# Patient Record
Sex: FEMALE | Race: WHITE | Employment: UNEMPLOYED | URBAN - METROPOLITAN AREA
[De-identification: names, ages, dates, MRNs, and addresses within clinical notes are randomized per-mention and may not be internally consistent; named-entity substitution may affect disease eponyms.]

---

## 2021-06-09 ENCOUNTER — APPOINTMENT (OUTPATIENT)
Dept: PHYSICAL THERAPY | Age: 31
End: 2021-06-09

## 2021-06-11 ENCOUNTER — HOSPITAL ENCOUNTER (OUTPATIENT)
Dept: PHYSICAL THERAPY | Age: 31
Discharge: HOME | End: 2021-06-11
Payer: MEDICAID

## 2021-06-11 PROCEDURE — 97161 PT EVAL LOW COMPLEX 20 MIN: CPT

## 2021-06-11 NOTE — PROGRESS NOTES
[facility] [facility] PHYSICAL THERAPY  [address] [facility] [address], [address] - Phone: [phone]  Fax: [phone] / [address] [facility]  Patient Name: Kristian Jaime : 1990   Medical   Diagnosis: Right knee pain [M25.561] Treatment Diagnosis: Right knee pain    Onset Date: 2021     Referral Source: [facility]): 2021   Prior Hospitalization: See medical history Provider #: [id number]   Prior Level of Function: Active lifestyle: Rock climbing, jogging, frisbee   Comorbidities: Arthritis   Medications: Verified on Patient Summary List   The Plan of Care and following information is based on the information from the initial evaluation.   ===========================================================================================  Assessment / key information: Patient is a 28 y.o. female presenting to the clinic with CC: right knee pain. HPI:.Fell while rock climbing  felt knee buckle to the medial side. She reports hearing a popping sound and her knee swelling immediately during her trip to the ER. She was given directions to RICE and bilateral crutches. She presents with deficits in AROM ext/flex and increased pain at her right knee and intermittent pain when walking, using stairs and driving long distance (using pedals) affecting ADL, work and leisure activities. Pt  will benefit from physical therapist management to address her impairments (listed below),  educate her, and improve her level of function.  Thanks for your referral.                                                       AROM                    PROM                      Strength (1-5)      Left Right Left Right Left Right   Knee Flexion 140 135 P! 142 138 P! 5 5     Extension 0 -3 0 0 P! 5 5      Palpation:    Neg/Pos   Neg/Pos   Neg/Pos   Joint Line Pos Quad tendon   Patellar ligament     Patella   Fibular head   Pes Anserinus     Tibial tubercle   Hamstring tendons   Infrapatellar fat pad      Lachmans                   []? Neg    [x]? Pos      Anterior Drawer           []? Neg    [x]? Pos        Thessaly:                    []? Neg    [x]? Pos                   Pivot Shift                    []? Neg    [x]? Pos      The patients method of injury and s/s are indicative of potential right ACL and medial meniscal involvement. She is scheduled to have and MRI on 6/15/21.     ===========================================================================================  Eval Complexity: History: LOW Complexity : Zero comorbidities / personal factors that will impact the outcome / POCExam:LOW Complexity : 1-2 Standardized tests and measures addressing body structure, function, activity limitation and / or participation in recreation  Presentation: LOW Complexity : Stable, uncomplicated  Clinical Decision Making:MEDIUM Complexity : FOTO score of 26-74Overall Complexity:LOW   Problem List: pain affecting function, decrease ROM, impaired gait/ balance, decrease ADL/ functional abilitiies, decrease activity tolerance, decrease flexibility/ joint mobility and decrease transfer abilities  FOTO score: 49 indicating 51% functional disability  Treatment Plan may include any combination of the following: Therapeutic exercise, Therapeutic activities, Neuromuscular re-education, Physical agent/modality, Gait/balance training, Manual therapy, Aquatic therapy, Patient education, Self Care training, Functional mobility training and Home safety training  Patient / Family readiness to learn indicated by: asking questions, trying to perform skills, interest, prior success with physical therapy treatment, eagerness to return to rock climbing and running.   Persons(s) to be included in education: patient (P)  Barriers to Learning/Limitations: None  Measures taken:    Patient Goal (s): \"Help my knee get better, return to rock climbing and jogging\"   Patient self reported health status: good  Rehabilitation Potential: excellent   Short Term Goals: To be accomplished in  3-4  weeks:  1. Patient to be adherent to HEP to facilitate pain control with ADL's.  2. Patient to improve right knee AROM to 0 deg. To facilitated equalization of gait mechanics. 3. Patient to improve driving tolerance to > 30 min to facilitate work related duties   Long Term Goals: To be accomplished in  6-8  weeks:  1. Patient to be Safe and Independent with HEP to self-manage/prevent symptoms after DC. 2. Patient to increase FS FOTO score to > 70 to indicate increased functional independence. 3. Patient to demonstrate stair negotiate stairs without UE assist  4x3 to facilitate home ambulation. Frequency / Duration:   Patient to be seen  2  times per week for 6-8  weeks:  Patient / Caregiver education and instruction: self care, activity modification and exercises. We reviewed our facility's Patient Personal Responsibilities (PPR) form, particularly in regards to compliance towards her appointment time, our attendance policy, and her home exercise program. Patient was informed of possible discharge for non-compliance to our attendance policy per PPR form. We also discussed her POC as deemed appropriate by the treating therapist and physician. Patient verbalized understanding that she must show objective and functional improvement in an appropriate time frame. Patient verbalized understanding that should progress or compliance be lacking, we will contact the referring physician for further consultation to address and attempt to establish alternate treatment strategies as necessary and/or possibly discharge. Therapist Signature: Elvira Montelongo, DESTIN Garner \"BJ\" Curtis Harper, MARJORIET, Cert. MDT, Cert. DN, Cert. SMT, Dip.  Osteopractic Date: 3/62/8110   Certification Period:  Time: 10:42 AM ===========================================================================================  I certify that the above Physical Therapy Services are being furnished while the patient is under my care. I agree with the treatment plan and certify that this therapy is necessary. Physician Signature:        Date:       Time:                                         Kaykay Esteves,*   Please sign and return to [employer] or you may fax the signed copy to [phone]. Thank you.

## 2021-06-11 NOTE — PROGRESS NOTES
PHYSICAL THERAPY - DAILY TREATMENT NOTE  Patient Name: Davee Buerger        Date: 2021  : 1990   [x]  Patient  Verified  Visit #:   1     Insurance: Payor: BLUE CROSS MEDICAID / Plan: [facility] [place] / Product Type: Managed Care Medicaid /      In time:   9:30          Out time:   10:10 Total Treatment Time (min):   40   Medicare Time Tracking (below)   Total Timed Codes (min):   1:1 Treatment Time:       TREATMENT AREA = Right knee pain [M25.561]    SUBJECTIVE    Pain Level (on 0 to 10 scale):  1  / 10   Medication Changes/New allergies or changes in medical history, any new surgeries or procedures? [x]  No    []  Yes   If yes, update Summary List:    Subjective Functional Status/Changes:  []  No changes reported     HISTORY    HPI: [address] while rock climbing  felt knee buckle to the medial side \"felt like my knee blew out again. \" Previous ACL reconstruction to right knee when she was 19. Taken to ER given crutches and told to RICE. Noticed swelling immediately following trauma. \"Probably going to have surgery\"    Present Symptoms: Pain when walking, pain at medial knee \"feels deeper in the joint\"    Present since:   [] Improving [x]  Unchanging []  Worsening          Commenced as a result of:  Trauma during fall rock climbing. or []  No apparent reason   or Surgery    Symptoms at onset: Immediate swelling and global pain at right knee following trauma. Reports hearing a snap. Constant symptoms: N/A    Intermittent symptoms: When walking pain on medial surface, initial at posterior surface.  Throbbing giving out feeling when using pedals, carrying groceries     What produces or worsens:  Walking, driving long distances, negotiating steps, jogging    What stops or reduces: Doing nothing (rest     Continued use makes the pain:  [] Better [x]  Worse []  No effect     Disturbed night: [] No    [x] Yes  Sometimes  Pain at rest: [x] No    [] Yes       Treatments this episode: N/A    Previous episodes: N/a    Previous treatment: Previous physical therapy for right knee following an ACL repair. Other:       Spinal history:    Paraesthesia: [x] No    [] Yes     Effect of medications (if appropriate)-N/A  General Health:  [x] Good []  Fair []  Poor     Imaging:  X rays show no fracture, MRI scheduled. 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 [address] was working at the [employer] but currently unable to perform duties (wants to return)     Leisure: Mechanical Stresses: Running, rock climbing, frisbee    Functional disability from present episode: Difficulty negotiating four steps to enter and one flight to get to room upstairs decreased, carrying groceries,     Summary:    [] Acute [x]  Sub-acute []  Chronic     [x] Trauma/Surgery []  Insidious onset        OBJECTIVE    Gait:  [] Normal    [] Abnormal    [x] Antalgic    [] NWB    Device: None    Describe: Decreased extension and time in SLS on RLE.     Myotome Level Muscles Nerve Reflex Sensation Action   L1-L3 Iliopsoas T12/L1-3  Quadriceps L2-4  Adductors L2-L4 (Iliacus)- Femoral  Femoral  Obturator/Sciatic N/A  L3-4 = Patella L1- Inguinal Crease  L2- Anterior Thigh  L3- Anterior Thigh above knee Hip Flexion  Knee Extension   L4 Tibialis anterior L4&5   Deep Peroneal Patella Anterior Knee Suprapatellar Ankle Dorsiflexion   L5 Extensor Hallucis Longus  Extensor Digitorum Lungus  Gluteus Medius Deep Peroneal         Superior Gluteal None Reliable Dorsum of Foot/Great Toe  Anterior Shank Extensor  to Great Toe        Hip Internal Rotation   S1 Peroneus Longus  Gastrocnemius & Soleus  Gluteus Luiz Superficial Peroneal  Tibial    Inferior Gluteal Achilles Tendon Lateral Shank around Lateral Malleolus  Lateral Aspect/Dorsum of GT   Plantar Flexion      Hip Extension   Notable findings from above: unremarkable    ROM / Strength  [] Unable to assess                  AROM                      PROM                   Strength (1-5)    Left Right Left Right Left Right   Hip Flexion WNL WNL WNL WNL 5 5    Extension WNL WNL WNL WNL 5 5    Abduction WNL WNL WNL WNL 4 5    Adduction WNL WNL WNL WNL 5 5         AROM          PROM                      Strength (1-5)    Left Right Left Right Left Right   Knee Flexion 140 135 P! 142 138 P! 5 5    Extension 0 -3 0 0 P! 5 5     Ankle Plantarflexion          Dorsiflexion           Flexibility: [] Unable to assess at this time  Hamstrings:    (L) Tightness= [x] WNL   [] Min   [] Mod   [] Severe    (R) Tightness= [x] WNL   [] Min   [] Mod   [] Severe  Quadriceps:    (L) Tightness= [x] WNL   [] Min   [] Mod   [] Severe    (R) Tightness= [x] WNL   [] Min   [] Mod   [] Severe  Gastroc:      (L) Tightness= [x] WNL   [] Min   [] Mod   [] Severe    (R) Tightness= [x] WNL   [] Min   [] Mod   [] Severe  Other:    Palpation:   Neg/Pos  Neg/Pos  Neg/Pos   Joint Line Pos Quad tendon  Patellar ligament    Patella  Fibular head  Pes Anserinus    Tibial tubercle  Hamstring tendons  Infrapatellar fat pad      Optional Tests:  Patellar Positioning (Static)   []L []R Normal []L []R Lateral   []L []R Monson Estrellita      []L []R Medial   []L []R [place]    Patellar Tracking   []L []R Glide (Lat)   []L []R Tilt (Lat)     []L []R Glide (Med)  []L []R Tilt (Med)      []L []R Tile (Inf)     Patellar Mobility   []L []R Hypermobile []L []R Hypomobile         Girth Measurements: unremarkable    Cm at  Cm 4\" above joint line   Cm 4\" below joint line   Left      Right         Lachmans  [] Neg    [x] Pos  Posterior Drawer [] Neg    [] Pos  Pivot Shift  [] Neg    [x] Pos Posterior Sag  [] Neg    [] Pos  CHACE   [] Neg    [] Pos Elizabeth's Test [] Neg    [] Pos  ALRI   [] Neg    [] Pos Squat   [] Neg    [] Pos  Valgus@ 0 Degrees [] Neg    [] Pos Izzy [] Neg    [] Pos  Valgus@ 30 Degrees [] Neg    [] Pos Patellar Apprehension[] Neg    [] Pos  Varus@ 0 Degrees [] Neg    [] Pos Zelaya's Compression [] Neg    [] Pos  Varus@ 30 Degrees [] Neg    [] Pos Ely's Test  [] Neg    [] Pos  Apley's Compression [] Neg    [] Pos Clifton's Test  [] Neg    [] Pos  Apley's Distraction [] Neg    [] Pos Stroke Test  [] Neg    [] Pos   Anterior Drawer [] Neg    [x] Pos Fluctuation Test [] Neg    [] Pos  Thessaly:                  [] Neg    [x] Pos                 Spine:      Movement loss:     Effect of repeated movements:     Effect of static positioning:     Spine testing:  [] Not relevant []  Relevant []  Secondary problem     Active movement, passive movement, resisted test During movement:  Produce, abolish, increase, decrease, NE After movement:  Better, worse, NB, NW, NE Inc. ROM Dec. ROM No Effect      []   []   []        []   []   []        []   []   []        []      []      []        Effect of static positioning   []   []   []        []   []   []        []   []   []        []      []      []           []      []      []        Other tests: e.g. loaded, compression, unloaded, etc.   []      []      []           []      []      []           []      []      []           []   []   []         Therapeutic Procedures:  Min Procedure Specifics + Rationale   n/a [x]  Patient Education (performed throughout session) [x] Review HEP    [] Progressed/Changed HEP based on:   [] proper performance and advancement of Therex/TA   [] reduction in pain level    [] increased functional capacity       [] change in directional preference     Modality rationale: decrease inflammation, decrease pain, increase tissue extensibility and increase muscle contraction/control to improve the patients ability to perform ADL's with greater ease     [x] Skin assessment post-treatment:  [x]intact []redness- no adverse reaction       []redness  adverse reaction:         Post Treatment Pain Level (on 0 to 10) scale:   1 / 10     ASSESSMENT    Assessment/Changes in Function: Justification for Eval Code Complexity:  Patient History : Low  Examination Low  Clinical Presentation: Low  Clinical Decision Making : FOTO Low   []  See Progress Note/Recertification   Patient will continue to benefit from skilled PT services to  modify and progress therapeutic interventions, address functional mobility deficits, address ROM deficits, address strength deficits, analyze and address soft tissue restrictions, analyze and cue movement patterns, analyze and modify body mechanics/ergonomics, assess and modify postural abnormalities and instruct in home and community integration to attain remaining goals       [x]  Upgrade activities as tolerated []  Continue plan of care   []  Discharge due to :    [x]  Other: Initiate POC     Therapist: Tatyana Schilling \"BJ\" Sia Rain, DPT, Cert. MDT, Cert. DN, Cert. SMT, Dip.  Osteopractic Date: 6/11/2021     Time: 9:21 AM

## 2021-06-21 ENCOUNTER — HOSPITAL ENCOUNTER (OUTPATIENT)
Dept: PHYSICAL THERAPY | Age: 31
Discharge: HOME | End: 2021-06-21
Payer: MEDICAID

## 2021-06-21 PROCEDURE — 97530 THERAPEUTIC ACTIVITIES: CPT

## 2021-06-21 PROCEDURE — 97535 SELF CARE MNGMENT TRAINING: CPT

## 2021-06-21 PROCEDURE — 97110 THERAPEUTIC EXERCISES: CPT

## 2021-06-21 NOTE — PROGRESS NOTES
PHYSICAL THERAPY - DAILY TREATMENT NOTE    Patient Name: Jarret Olivares        Date: 2021  : 1990   YES Patient  Verified  Visit #:   2   of   12  Insurance: Payor: BLUE CROSS MEDICAID / Plan: [facility] HE[employer]EEPERS PLUS / Product Type: Managed Care Medicaid /      In time: 4:15 Out time: 4:53   Total Treatment Time: 38     Medicare/BCBS [place] Time Tracking (below)   Total Timed Codes (min):  38 1:1 Treatment Time:  38     TREATMENT AREA =  Right knee    SUBJECTIVE    Pain Level (on 0 to 10 scale):  1  / 10   Medication Changes/New allergies or changes in medical history, any new surgeries or procedures? NO    If yes, update Summary List   Subjective Functional Status/Changes:  []  No changes reported     \"Its the same as it always is. I had the MRI. I have a meniscus tear\"          OBJECTIVE    18 min Therapeutic Exercise:  [x]  See flow sheet   Rationale:      increase ROM and increase strength to improve the patients ability to amb with less pain     10 min Therapeutic Activity: QS and education on alignment for Amb and ADLs   Rationale:   Decrease stress on knee    10 mins Self Care: Education on injury and medical options likely moving forward     min Patient Education:  YES  Reviewed HEP   []  Progressed/Changed HEP based on:   Updated HEP     Other Objective/Functional Measures:    Interm c/o pain through out, most noticeable with quad contraction at end range extension. Pain localized to medial patella.  No pain with posterior chain activities such as hip ext SLR, prone HS curl and bridges  Educated pt on anatomy of injury and mst likely medical options moving forward and benefits of PT   Post Treatment Pain Level (on 0 to 10) scale:   1  / 10     ASSESSMENT    Assessment/Changes in Function:     Reports of pain with SLR flex, less with QS      []  See Progress Note/Recertification   Patient will continue to benefit from skilled PT services to analyze, cue, progress, modify,, demonstrate, instruct, and address, movement patterns, therapeutic interventions, postural abnormalities, soft tissue restrictions, ROM, strength, functional mobility, body mechanics/ergonomics, and home and community integration, to attain remaining goals.    Progress toward goals / Updated goals:    Plan to add step ups NV     PLAN    []  Upgrade activities as tolerated YES Continue plan of care   []  Discharge due to :    []  Other:      Therapist: Willow Reynoso DPT    Date: 6/21/2021 Time: 5:15 PM     Future Appointments   Date Time Provider Elizabeth Gage   6/23/2021  9:30 AM Cathleen Vela, PT [facility]   6/28/2021  5:00 PM Arnold Reynoso PT [facility]   6/30/2021  8:00 AM Cathleen Vela, PT [facility]

## 2021-06-23 ENCOUNTER — HOSPITAL ENCOUNTER (OUTPATIENT)
Dept: PHYSICAL THERAPY | Age: 31
Discharge: HOME | End: 2021-06-23
Payer: MEDICAID

## 2021-06-23 PROCEDURE — 97112 NEUROMUSCULAR REEDUCATION: CPT

## 2021-06-23 PROCEDURE — 97110 THERAPEUTIC EXERCISES: CPT

## 2021-06-23 PROCEDURE — 97530 THERAPEUTIC ACTIVITIES: CPT

## 2021-06-23 NOTE — PROGRESS NOTES
PHYSICAL THERAPY - DAILY TREATMENT NOTE    Patient Name: Maricel Russell        Date: 2021  : 1990   YES Patient  Verified  Visit #:   3   of     Insurance: Payor: BLUE CROSS MEDICAID / Plan: [address] [place] [address] / Product Type: Managed Care Medicaid /      In time: 8:30 am Out time: 10:25   Total Treatment Time: 55     Medicare/BCBS Time Tracking (below)   Total Timed Codes (min):  55 1:1 Treatment Time:  40     TREATMENT AREA = Right knee pain [M25.561]    SUBJECTIVE    Pain Level (on 0 to 10 scale):  1  / 10   Medication Changes/New allergies or changes in medical history, any new surgeries or procedures? NO    If yes, update Summary List   Subjective Functional Status/Changes:  []  No changes reported     \"No increased pain following previous session just a little sore\"   I walked a lot more Monday so maybe that's why I was having a hard time. 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)     OBJECTIVE     Therapeutic Procedures:  Min Procedure Specifics + Rationale   n/a [x]  Patient Education (performed throughout session) [x] Review HEP    [] Progressed/Changed HEP based on:   [] proper performance and advancement of Therex/TA   [] reduction in pain level    [] increased functional capacity       [] change in directional preference   15(10) [x] Therapeutic Exercise   [x]  See Flowsheet   Rationale: increase ROM and increase strength to improve the patients ability to participate in ADL's    8 [x]  Gait Training       SS,HK  Rationale: Normalize gait, increase proprioceptive and kinesthetic awareness, coordination, balance   10 [x] Therapeutic Activity   [x]  See Flowsheet    Rationale:  To improve safety, proprioception, coordination, and efficiency with tasks   12 [x] Neuromuscular Re-ed   [x]  See Flowsheet    Rationale: increase ROM, increase strength, improve coordination, improve balance and increase proprioception  to improve the patients ability to safely participate in ADL's     Modality rationale: decrease inflammation, decrease pain, increase tissue extensibility and increase muscle contraction/control to improve the patients ability to perform ADL's with greater ease     Min Type Additional Details   10 [x]  Cold Pack   [] pre-ROMÁN      [x] post-ROMÁN  []  Ice massage Location:Right knee    [x] supine              [] prone  [x] legs elevated    [] legs flat   [] sitting   [x] Skin assessment post-treatment:  [x]intact [x]redness- no adverse reaction       []redness  adverse reaction:     Other Objective/Functional Measures:    Pt educated on her MRI results. She required VC/Demo for safety/form during tap-down  Added: Tap down using FWD/LAT 3 inch step, mini squats, Hip 3 ways in standing  She required VC/Demo for safety/form during tap-down  HEP: OTB TKE added. Post Treatment Pain Level (on 0 to 10) scale:   0  / 10     ASSESSMENT    Assessment/Changes in Function:       Pt displays Quad lag during LAQ and fatigue during tap downs. Continue to progress Quad strengthening exercises to promote control and proprioception during ADL/ambulation. She c/o medial right knee tenderness throughout today's session especially during S/L ABD. Patient will continue to benefit from skilled PT services to modify and progress therapeutic interventions, address functional mobility deficits, address ROM deficits, address strength deficits, analyze and address soft tissue restrictions, analyze and cue movement patterns, analyze and modify body mechanics/ergonomics, assess and modify postural abnormalities and instruct in home and community integration  to attain remaining goals   Progress toward goals / Updated goals: · Short Term Goals: To be accomplished in  3-4  weeks:  1.  Patient to be adherent to HEP to facilitate pain control with ADL's.  2. Patient to improve right knee AROM to 0 deg. To facilitated equalization of gait mechanics. 3. Patient to improve driving tolerance to > 30 min to facilitate work related duties     PLAN    [x]  Upgrade activities as tolerated  [x]  Update interventions per flow sheet YES Continue plan of care   []  Discharge due to :    []  Other:      Therapist: Claus Pulido, SPT  Pascual \"BJ\" Sharon, DPT, Cert. MDT, Cert. DN, Cert. SMT, Dip.  Osteopractic    Date: 6/23/2021 Time: 9:39 AM     Future Appointments   Date Time Provider Elizabeth Gage   6/29/2021  9:30 AM Maylin Genao, PT [facility]   6/30/2021  8:00 AM Sarah Miramontes, PT [facility]

## 2021-06-28 ENCOUNTER — APPOINTMENT (OUTPATIENT)
Dept: PHYSICAL THERAPY | Age: 31
End: 2021-06-28
Payer: MEDICAID

## 2021-06-29 ENCOUNTER — HOSPITAL ENCOUNTER (OUTPATIENT)
Dept: PHYSICAL THERAPY | Age: 31
Discharge: HOME | End: 2021-06-29
Payer: MEDICAID

## 2021-06-29 PROCEDURE — 97112 NEUROMUSCULAR REEDUCATION: CPT

## 2021-06-29 PROCEDURE — 97110 THERAPEUTIC EXERCISES: CPT

## 2021-06-29 PROCEDURE — 97530 THERAPEUTIC ACTIVITIES: CPT

## 2021-06-29 NOTE — PROGRESS NOTES
PHYSICAL THERAPY - DAILY TREATMENT NOTE    Patient Name: Zach Confer        Date: 2021  : 1990   YES Patient  Verified  Visit #:   4   of   12  Insurance: Payor: BLUE CROSS MEDICAID / Plan: [facility] HEALTHKEEPERS PLUS / Product Type: Managed Care Medicaid /      In time: 9:25 am Out time: 10:25 am   Total Treatment Time: 60     Medicare/BCBS Time Tracking (below)   Total Timed Codes (min):  60 1:1 Treatment Time:  50     TREATMENT AREA = Right knee pain [M25.561]    SUBJECTIVE    Pain Level (on 0 to 10 scale):  0  / 10   Medication Changes/New allergies or changes in medical history, any new surgeries or procedures? NO    If yes, update Summary List   Subjective Functional Status/Changes:  []  No changes reported     \"I had to run to my car away from yellow flies (20 ft) it felt ok. Walked on a trail (about a mile) I felt ok during but sore after. My new job starts today with [employer] not sure of all the job duties maybe some light lifting according to my supervisor. \"     \"I may need to reschedule tomorrows appointment. \"       OBJECTIVE     Therapeutic Procedures:  Min Procedure Specifics + Rationale   n/a [x]  Patient Education (performed throughout session) [x] Review HEP    [] Progressed/Changed HEP based on:   [] proper performance and advancement of Therex/TA   [] reduction in pain level    [] increased functional capacity       [] change in directional preference   20 [x] Therapeutic Exercise   [x]  See Flowsheet   Rationale: increase ROM and increase strength to improve the patients ability to participate in ADL's    9 [x]  Gait Training         Rationale: Normalize gait, increase proprioceptive and kinesthetic awareness, coordination, balance   13 [x] Therapeutic Activity   [x]  See Flowsheet  Mini squat, step up/down, tap downs  Rationale:  To improve safety, proprioception, coordination, and efficiency with tasks   8 [x] Neuromuscular Re-ed   [x]  See [employer], LAQ, Standing knee flex  Rationale: increase ROM, increase strength, improve coordination, improve balance and increase proprioception  to improve the patients ability to safely participate in ADL's     Modality rationale: decrease inflammation, decrease pain, increase tissue extensibility and increase muscle contraction/control to improve the patients ability to perform ADL's with greater ease     Min Type Additional Details   10 [x]  Cold Pack   [] pre-ROMÁN      [x] post-ROMÁN  []  Ice massage Location:Right knee    [x] supine              [] prone  [x] legs elevated    [] legs flat   [] sitting   [x] Skin assessment post-treatment:  [x]intact [x]redness- no adverse reaction       []redness  adverse reaction:     Other Objective/Functional Measures:    Pt required VC/Demo for correct form/safety during exercises. Initiated: Hamstring stretch at step, SL bridges, standing knee flexion  Increased reps/sets represented by FS. Post Treatment Pain Level (on 0 to 10) scale:   0  / 10     ASSESSMENT    Assessment/Changes in Function:       Pt continues to display quad lag bilaterally during LAQ. Continue to progress SLS and dynamic activities to facilitate her stability and balance. Patient will continue to benefit from skilled PT services to modify and progress therapeutic interventions, address functional mobility deficits, address ROM deficits, address strength deficits, analyze and address soft tissue restrictions, analyze and cue movement patterns, analyze and modify body mechanics/ergonomics, assess and modify postural abnormalities and instruct in home and community integration  to attain remaining goals   Progress toward goals / Updated goals: · Short Term Goals: To be accomplished in  3-4  weeks:  1. Patient to be adherent to HEP to facilitate pain control with ADL's.  2. Patient to improve right knee extension AROM to 0 deg. To facilitated equalization of gait mechanics.   3. Patient to improve driving tolerance TZ > 27 min to facilitate work related duties     PLAN    [x]  Upgrade activities as tolerated  [x]  Update interventions per flow sheet YES Continue plan of care   []  Discharge due to :    []  Other:      Therapist: Drake Schaumann, DESTIN Flynne \"BJ\" MARJORIE HerreraT, Cert. MDT, Cert. DN, Cert. SMT, Dip.  Osteopractic    Date: 6/29/2021 Time: 8:19 AM     Future Appointments   Date Time Provider Elizabeth Gage   6/29/2021  9:30 AM Ampuneet Rojas, PT [facility]   6/30/2021  8:00 AM Ashly Rojas PT [facility]

## 2021-06-30 ENCOUNTER — APPOINTMENT (OUTPATIENT)
Dept: PHYSICAL THERAPY | Age: 31
End: 2021-06-30
Payer: MEDICAID

## 2021-07-16 ENCOUNTER — HOSPITAL ENCOUNTER (OUTPATIENT)
Dept: PHYSICAL THERAPY | Age: 31
Discharge: HOME | End: 2021-07-16
Payer: MEDICAID

## 2021-07-16 PROCEDURE — 97116 GAIT TRAINING THERAPY: CPT

## 2021-07-16 PROCEDURE — 97112 NEUROMUSCULAR REEDUCATION: CPT

## 2021-07-16 PROCEDURE — 97110 THERAPEUTIC EXERCISES: CPT

## 2021-07-16 PROCEDURE — 97530 THERAPEUTIC ACTIVITIES: CPT

## 2021-07-16 NOTE — PROGRESS NOTES
[address] [facility] PHYSICAL THERAPY  [address] [facility] [address], [address] - Phone: [phone]  Fax: [phone]  PROGRESS NOTE  Patient Name: Dejuan Dan : 1990   Treatment/Medical Diagnosis: Right knee pain [M25.561]   Referral Source: Carlos Castellanos,*     Date of Initial Visit: 21 Attended Visits: 5 Missed Visits: 2 weeks     SUMMARY OF TREATMENT  Patient's POC has consisted of therex, therapeutic activities, manual therapy prn, modalities prn, pt. education, and a comprehensive HEP. Treatment strategies used to address functional mobility deficits, ROM deficits, strength deficits, analyze and address soft tissue restrictions, analyze and cue movement patterns, analyze and modify body mechanics/ergonomics, assess and modify postural abnormalities and instruct in home and community integration. CURRENT STATUS  Patient's pain has progressively improved, with report of being able to go camping x2weeks, during which she hiked several trails. She reports the pain \"not being as bad as it had been or expected it to be\". She reports since her last hike, she feels a sense of intermittent  \"shifting\" in the knee with walking. Goal/Measure of Progress Goal Met? · Short Term Goals:   1. Patient to be adherent to HEP to facilitate pain control with ADL's.  2. Patient to improve right knee AROM to 0 deg. To facilitated equalization of gait mechanics. 3. Patient to improve driving tolerance to > 30 min to facilitate work related duties   MET    MET      MET     New Goals to be achieved in __6__  weeks:  1. Patient to be Safe and Independent with HEP to self-manage/prevent symptoms after DC. 2. Patient to increase FS FOTO score to > 70 to indicate increased functional independence. 3. Patient to demonstrate stair negotiate stairs without UE assist  4x3 to facilitate home ambulation.     Frequency / Duration:   Patient to be seen  2-3  times per week for 6  weeks:    RECOMMENDATIONS  Continue and progress functional therex/therapeutic activity as able, utilizing manual therapy and modalities prn. Progress patient towards independent HEP to facilitate self-management of symptoms and progress gains after PT. If you have any questions/comments please contact us directly at [phone]. Thank you for allowing us to assist in the care of your patient. Therapist Signature: Ruddy Vo \"BJ\" Tung Barney, MARJORIET, Cert. MDT, Cert. DN, Cert. SMT, Dip. Osteopractic Date: 8/94/5047   Certification Period: n/a     Reporting Period n/a   Time: 9:39 AM   NOTE TO PHYSICIAN:  PLEASE COMPLETE THE ORDERS BELOW AND FAX TO   [facility]: [phone]. If you are unable to process this request in 24 hours please contact our office: [phone].    ___ I have read the above report and request that my patient continue as recommended.   ___ I have read the above report and request that my patient continue therapy with the following changes/special instructions:_________________________________________________________   ___ I have read the above report and request that my patient be discharged from therapy.      Physician Signature:        Date:       Time:                                        Hue Herrera,*

## 2021-07-16 NOTE — PROGRESS NOTES
PHYSICAL THERAPY - DAILY TREATMENT NOTE    Patient Name: Ag Chung        Date: 2021  : 1990   YES Patient  Verified  Visit #:   5     Insurance: Payor: BLUE CROSS MEDICAID / Plan: [employer] HEALTHKEEPERS PLUS / Product Type: Managed Care Medicaid /      In time: 9:17 Out time: 10:10   Total Treatment Time: 53     Medicare/BCBS Time Tracking (below)   Total Timed Codes (min):  53 1:1 Treatment Time:  53     TREATMENT AREA = Right knee pain [M25.561]    SUBJECTIVE    Pain Level (on 0 to 10 scale):  3  / 10   Medication Changes/New allergies or changes in medical history, any new surgeries or procedures? NO    If yes, update Summary List   Subjective Functional Status/Changes:  []  No changes reported     \"I just got back from a camping trip. My knee did surprisingly well. It didn't hurt as much as I expected it to. I did get a lot of soreness from the last trail we did. Now it feels like there's a \"shift\" in it when I do some things. \"          OBJECTIVE     Therapeutic Procedures:  Min Procedure Specifics + Rationale   n/a [x]  Patient Education (performed throughout session) [x] Review HEP    [] Progressed/Changed HEP based on:   [] proper performance and advancement of Therex/TA   [] reduction in pain level    [] increased functional capacity       [] change in directional preference   15 [x] Therapeutic Exercise   [x]  See Flowsheet   Rationale: increase ROM and increase strength to improve the patients ability to participate in ADL's    8 [x]  Gait Training       Agility Ladder: Zig Zag, mini-zigzag, in/out sidestep, in/out forward, sidestepping, HK, lateral cammy cammy. Rationale: Normalize gait, increase proprioceptive and kinesthetic awareness, coordination, balance   15 [x] Therapeutic Activity   [x]  See Flowsheet  Rationale:  To improve safety, proprioception, coordination, and efficiency with tasks   15 [x] Neuromuscular Re-ed   [x]  See Flowsheet  Rationale: increase ROM, increase strength, improve coordination, improve balance and increase proprioception  to improve the patients ability to safely participate in ADL's     Modality rationale: decrease inflammation, decrease pain, increase tissue extensibility and increase muscle contraction/control to improve the patients ability to perform ADL's with greater ease     Min Type Additional Details   10 [x]  Cold Pack   [] pre-ROMÁN      [x] post-ROMÁN  []  Ice massage Location:Right Knee    [x] supine              [] prone  [] legs elevated    [] legs flat   [] sitting   [x] Skin assessment post-treatment:  [x]intact [x]redness- no adverse reaction       []redness  adverse reaction:     Other Objective/Functional Measures:    See PN     Post Treatment Pain Level (on 0 to 10) scale:   0  / 10     ASSESSMENT    Assessment/Changes in Function:       See PN         Patient will continue to benefit from skilled PT services to modify and progress therapeutic interventions, address functional mobility deficits, address ROM deficits, address strength deficits, analyze and address soft tissue restrictions, analyze and cue movement patterns, analyze and modify body mechanics/ergonomics, address imbalance/dizziness and instruct in home and community integration  to attain remaining goals   Progress toward goals / Updated goals:    See PN     PLAN    [x]  Upgrade activities as tolerated  [x]  Update interventions per flow sheet YES Continue plan of care   []  Discharge due to :    []  Other:      Therapist: Klaudia Lujan \"RENATA\" Sandra Bee DPT, Cert. MDT, Cert. DN, Cert. SMT, Dip.  Osteopractic    Date: 7/16/2021 Time: 9:07 AM     Future Appointments   Date Time Provider Elizabeth Gage   7/16/2021  9:30 AM Milo Crook, PT [facility]

## 2021-07-23 ENCOUNTER — HOSPITAL ENCOUNTER (OUTPATIENT)
Dept: PHYSICAL THERAPY | Age: 31
Discharge: HOME | End: 2021-07-23
Payer: MEDICAID

## 2021-07-23 PROCEDURE — 97530 THERAPEUTIC ACTIVITIES: CPT

## 2021-07-23 PROCEDURE — 97112 NEUROMUSCULAR REEDUCATION: CPT

## 2021-07-23 PROCEDURE — 97116 GAIT TRAINING THERAPY: CPT

## 2021-07-23 PROCEDURE — 97110 THERAPEUTIC EXERCISES: CPT

## 2021-07-23 NOTE — PROGRESS NOTES
PHYSICAL THERAPY - DAILY TREATMENT NOTE    Patient Name: Ariel Bailey        Date: 2021  : 1990   YES Patient  Verified  Visit #:      12  Insurance: Payor: BLUE CROSS MEDICAID / Plan: [facility] HEALTHKEEPERS PLUS / Product Type: Managed Care Medicaid /      In time: 8:38 Out time: 10:48   Total Treatment Time: 70     Medicare/BCBS Time Tracking (below)   Total Timed Codes (min):  70 1:1 Treatment Time:  60     TREATMENT AREA = Right knee pain [M25.561]    SUBJECTIVE    Pain Level (on 0 to 10 scale):  0  / 10   Medication Changes/New allergies or changes in medical history, any new surgeries or procedures? NO    If yes, update Summary List   Subjective Functional Status/Changes:  []  No changes reported     \"It's ok today but yesterday it kept buckling on me. I haven't had my new work schedule yet so I haven't been able to schedule. \"          OBJECTIVE     Therapeutic Procedures:  Min Procedure Specifics + Rationale   n/a [x]  Patient Education (performed throughout session) [x] Review HEP    [] Progressed/Changed HEP based on:   [] proper performance and advancement of Therex/TA   [] reduction in pain level    [] increased functional capacity       [] change in directional preference   15 [x] Therapeutic Exercise   [x]  See Flowsheet   Rationale: increase ROM and increase strength to improve the patients ability to participate in ADL's    15 [x] Therapeutic Activity   [x]  See Flowsheet  Forward/Back Lunge with rotation  [place] Squats  Rationale:  To improve safety, proprioception, coordination, and efficiency with tasks   17 [x] Neuromuscular Re-ed   [x]  See Flowsheet  BOSU Squats  BOSU Static Lunge  Rationale: increase ROM, increase strength, improve coordination, improve balance and increase proprioception  to improve the patients ability to safely participate in ADL's     8 [x]  Gait Training Agility Ladder: Zig Zag, mini-zigzag, in/out sidestep, in/out forward, sidestepping, HK, lateral cammy cammy.   (performed on balls of feet)  Rationale: Normalize gait, increase proprioceptive and kinesthetic awareness, coordination, balance       Modality rationale: decrease inflammation, decrease pain, increase tissue extensibility and increase muscle contraction/control to improve the patients ability to perform ADL's with greater ease     Min Type Additional Details   10 [x]  Cold Pack   [] pre-ROMÁN      [x] post-ROMÁN  []  Ice massage Location:Right Knee    [] supine              [] prone  [] legs elevated    [] legs flat   [] sitting   [x] Skin assessment post-treatment:  [x]intact [x]redness- no adverse reaction       []redness  adverse reaction:     Other Objective/Functional Measures: Added new therex per flow sheet. Post Treatment Pain Level (on 0 to 10) scale:   0  / 10     ASSESSMENT    Assessment/Changes in Function:       Notable lack of stability when performing lateral movements in lunge         Patient will continue to benefit from skilled PT services to modify and progress therapeutic interventions, address functional mobility deficits, address ROM deficits, address strength deficits, analyze and address soft tissue restrictions, analyze and cue movement patterns, analyze and modify body mechanics/ergonomics and instruct in home and community integration  to attain remaining goals   Progress toward goals / Updated goals:    1st session since progress note. No significant progress observed       PLAN    [x]  Upgrade activities as tolerated  [x]  Update interventions per flow sheet YES Continue plan of care   []  Discharge due to :    []  Other:      Therapist: Michelle Troncoso \"RENATA\" JAVON Herrera, Cert. MDT, Cert. DN, Cert. SMT, Dip. Osteopractic    Date: 7/23/2021 Time: 8:46 AM   No future appointments.

## 2021-08-05 ENCOUNTER — HOSPITAL ENCOUNTER (OUTPATIENT)
Dept: PHYSICAL THERAPY | Age: 31
Discharge: HOME | End: 2021-08-05
Payer: MEDICAID

## 2021-08-05 PROCEDURE — 97530 THERAPEUTIC ACTIVITIES: CPT

## 2021-08-05 PROCEDURE — 97112 NEUROMUSCULAR REEDUCATION: CPT

## 2021-08-05 PROCEDURE — 97110 THERAPEUTIC EXERCISES: CPT

## 2021-08-05 NOTE — PROGRESS NOTES
PHYSICAL THERAPY - DAILY TREATMENT NOTE    Patient Name: Ramila Jasso        Date: 2021  : 1990   YES Patient  Verified  Visit #:     Insurance: Payor: BLUE CROSS MEDICAID / Plan: [facility] HEALTHKEEPERS PLUS / Product Type: Managed Care Medicaid /      In time: 8:46 Out time: 9:37   Total Treatment Time: 51     Medicare/BCBS [place] Time Tracking (below)   Total Timed Codes (min):  51 1:1 Treatment Time:  51     TREATMENT AREA =  Right knee pain [M25.561]  SUBJECTIVE    Pain Level (on 0 to 10 scale):  3  / 10   Medication Changes/New allergies or changes in medical history, any new surgeries or procedures? NO    If yes, update Summary List   Subjective Functional Status/Changes:  []  No changes reported     Pt reports increased pain due to having been working more, which requires standing/walking for prolonged periods of time. Pt reports that she is working at [employer] and [employer]. Pt reports that pain was 8/10 a couple of days ago. Pt reports that she is not able to perform HEP daily due to working 2 jobs and taking online classes.        OBJECTIVE    21 min Therapeutic Exercise:  [x]  See flow sheet   Rationale:      increase ROM, increase strength, improve coordination, improve balance and increase proprioception to improve the patients ability to perform ADLs/IADLs, functional mobility and gait safely and independently without increased pain/symptoms     15 min Therapeutic Activity: See flow sheet   Rationale: increase ROM, increase strength, improve coordination, improve balance and increase proprioception to improve the patients ability to perform ADLs/IADLs, functional mobility and gait safely and independently without increased pain/symptoms     15 min Neuromuscular Re-ed: See flow sheet   Rationale: to improve the patients ability to perform ADLs/IADLs, functional mobility and gait safely and independently without increased pain/symptomsto improve the patients ability to perform ADLs/IADLs, functional mobility and gait safely and independently without increased pain/symptoms       During TE/TA/NM min Patient Education:  YES  Reviewed HEP   []  Progressed/Changed HEP based on:   Cont HEP; discussed importance of compliance     Other Objective/Functional Measures:    Exercises per flow sheet  Initiated clam, piriformis stretch, prone quad stretch     Post Treatment Pain Level (on 0 to 10) scale:   1  / 10     ASSESSMENT    Assessment/Changes in Function:     Tolerated exercises without c/o increased pain  Demonstrates squat to ~90 degrees knee flexion with good form     []  See Progress Note/Recertification   Patient will continue to benefit from skilled PT services to modify and progress therapeutic interventions, address functional mobility deficits, address ROM deficits, address strength deficits, analyze and address soft tissue restrictions, analyze and cue movement patterns, analyze and modify body mechanics/ergonomics, assess and modify postural abnormalities, address imbalance/dizziness and instruct in home and community integration to attain remaining goals. Progress toward goals / Updated goals:    Progressing toward goals:  1. Patient to be Safe and Independent with HEP to self-manage/prevent symptoms after DC. - Reports partial compliance with HEP  2. Patient to increase FS FOTO score to > 70 to indicate increased functional independence.    3. Patient to demonstrate stair negotiate stairs without UE assist  4x3 to facilitate home ambulation. - Performed step-ups without UE assist       PLAN    [x]  Upgrade activities as tolerated YES Continue plan of care   []  Discharge due to :    []  Other:      Therapist: Azra Garibay, PT    Date: 8/5/2021 Time: 8:57 AM     Future Appointments   Date Time Provider Elizabeth Gage   8/10/2021  5:00 PM Dulce Ndiaye, JEANNE [facility]

## 2021-08-10 ENCOUNTER — HOSPITAL ENCOUNTER (OUTPATIENT)
Dept: PHYSICAL THERAPY | Age: 31
Discharge: HOME | End: 2021-08-10
Payer: MEDICAID

## 2021-08-10 PROCEDURE — 97110 THERAPEUTIC EXERCISES: CPT

## 2021-08-10 PROCEDURE — 97530 THERAPEUTIC ACTIVITIES: CPT

## 2021-08-10 PROCEDURE — 97112 NEUROMUSCULAR REEDUCATION: CPT

## 2021-08-10 NOTE — PROGRESS NOTES
PHYSICAL THERAPY - DAILY TREATMENT NOTE    Patient Name: Ayesha Bond        Date: 8/10/2021  : 1990   YES Patient  Verified  Visit #:     Insurance: Payor: BLUE CROSS MEDICAID / Plan: [facility] HEALTHKEEPERS PLUS / Product Type: Managed Care Medicaid /      In time: 4:56 Out time: 5:38   Total Treatment Time: 42     Medicare/BCBS [place] Time Tracking (below)   Total Timed Codes (min):  42 1:1 Treatment Time:  42     TREATMENT AREA =  Right knee pain [M25.561]    SUBJECTIVE    Pain Level (on 0 to 10 scale):  1  / 10   Medication Changes/New allergies or changes in medical history, any new surgeries or procedures?    no    If yes, update Summary List   Subjective Functional Status/Changes:  []  No changes reported     Reports increased pain and frequency of right knee \"giving out\" after PT sessions. States she has a f/u with MD on . \"I don't know why I have to wait so long for him to tell me what I already know. That I need to schedule surgery. \"     \"I've been trying to do the exercises when I can. \"         OBJECTIVE    17 min Therapeutic Exercise:  [x]  See flow sheet   Rationale:      increase ROM, increase strength, improve coordination, and increase proprioception to improve the patients ability to perform ADL's, gait, and functional mobility with decreased pain and improve joint mechanics. 8 min Therapeutic Activity: Bridges, stand HR/TR   Rationale:      increase ROM, increase strength, improve coordination, and increase proprioception to improve the patients ability to perform ADL's, gait, and functional mobility with decreased pain and improved joint mechanics.    17 min Neuromuscular Re-ed: See flowsheet; Kinesiotape to right knee (\"O\")   Rationale:      increase ROM, increase strength, improve coordination, improve balance, and increase proprioception to improve the patients ability to perform ADL's, gait, and functional mobility with decreased pain and improved joint mechanics. min Patient Education:  YES  Reviewed HEP   []  Progressed/Changed HEP based on:   Cont HEP     Other Objective/Functional Measures:    Held squats and step ups to assess response to exercises with decreased load on right knee. Increased reps with standing HR/TR, clams, prone knee flexion, prone TKE, and bridges. Post Treatment Pain Level (on 0 to 10) scale:   1  / 10     ASSESSMENT    Assessment/Changes in Function:     Required tactile cues for correct positioning with S/L hip abd and clam.     []  See Progress Note/Recertification   Patient will continue to benefit from skilled PT services to modify and progress therapeutic interventions, address functional mobility deficits, address ROM deficits, address strength deficits, analyze and address soft tissue restrictions, analyze and cue movement patterns, analyze and modify body mechanics/ergonomics, assess and modify postural abnormalities and instruct in home and community integration to attain remaining goals. Progress toward goals / Updated goals:    Progressing toward goals:  1. Patient to be Safe and Independent with HEP to self-manage/prevent symptoms after DC. - Partial compliance with HEP  2. Patient to increase FS FOTO score to > 70 to indicate increased functional independence. 3. Patient to demonstrate stair negotiate stairs without UE assist  4x3 to facilitate home ambulation.      PLAN    [x]  Upgrade activities as tolerated YES Continue plan of care   []  Discharge due to :    []  Other:      Therapist: Perlita Pandey PT    Date: 8/10/2021 Time: 5:05 PM     Future Appointments   Date Time Provider Elizabeth Gage   8/16/2021  3:30 PM Genesis Cardoza PT [facility]   8/20/2021  8:45 AM [facility]

## 2021-08-17 ENCOUNTER — HOSPITAL ENCOUNTER (OUTPATIENT)
Dept: PHYSICAL THERAPY | Age: 31
Discharge: HOME | End: 2021-08-17
Payer: MEDICAID

## 2021-08-17 PROCEDURE — 97530 THERAPEUTIC ACTIVITIES: CPT

## 2021-08-17 PROCEDURE — 97110 THERAPEUTIC EXERCISES: CPT

## 2021-08-17 PROCEDURE — 97112 NEUROMUSCULAR REEDUCATION: CPT

## 2021-08-17 PROCEDURE — 97014 ELECTRIC STIMULATION THERAPY: CPT

## 2021-08-17 NOTE — PROGRESS NOTES
PHYSICAL THERAPY - DAILY TREATMENT NOTE    Patient Name: Fiona Alamo        Date: 2021  : 1990   YES Patient  Verified  Visit #:     Insurance: Payor: BLUE CROSS MEDICAID / Plan: [place] [employer] / Product Type: Managed Care Medicaid /      In time: 4:04 Out time: 4:57   Total Treatment Time: 53     Medicare/BCBS Time Tracking (below)   Total Timed Codes (min):  53 1:1 Treatment Time:  38     TREATMENT AREA = Right knee pain [M25.561]    SUBJECTIVE    Pain Level (on 0 to 10 scale):  3- 10   Medication Changes/New allergies or changes in medical history, any new surgeries or procedures? NO    If yes, update Summary List   Subjective Functional Status/Changes:  []  No changes reported     \"It's not good. I've been walking a lot at work and having to carry stuff I really have no business carrying but my coworkers keep telling me I'll be fine. \"   \"It's been giving out on me a lot. It gave out with my first step out of bed this morning. \"       OBJECTIVE     Therapeutic Procedures:  Min Procedure Specifics + Rationale   n/a [x]  Patient Education (performed throughout session) [x] Review HEP    [] Progressed/Changed HEP based on:   [] proper performance and advancement of Therex/TA   [] reduction in pain level    [] increased functional capacity       [] change in directional preference   15 [x] Therapeutic Exercise   [x]  See Flowsheet   Rationale: increase ROM and increase strength to improve the patients ability to participate in ADL's    15 [x] Therapeutic Activity   [x]  See Flowsheet    Rationale:  To improve safety, proprioception, coordination, and efficiency with tasks   8 [x] Neuromuscular Re-ed   [x]  See Flowsheet  KT Taping to knee  Rationale: increase ROM, increase strength, improve coordination, improve balance and increase proprioception  to improve the patients ability to safely participate in ADL's     Modality rationale: decrease inflammation, decrease pain, increase tissue extensibility and increase muscle contraction/control to improve the patients ability to perform ADL's with greater ease     Min Type Additional Details   15 [x] E-Stim Type:IFC  Attended? NO Location: Right knee  [] supine              [] prone  [] legs elevated   [] legs flat  [] sitting   [] sidelying - [] left [] right  [] with heat  [] with ice  [] Vasopneumatic Device    [x] Skin assessment post-treatment:  [x]intact [x]redness- no adverse reaction       []redness  adverse reaction:     Other Objective/Functional Measures:    Limited tolerance towards standing therex due to pain     Post Treatment Pain Level (on 0 to 10) scale:   1  / 10     ASSESSMENT    Assessment/Changes in Function:       Responded well to KT tape since last treatment session         Patient will continue to benefit from skilled PT services to modify and progress therapeutic interventions, address functional mobility deficits, address ROM deficits, address strength deficits, analyze and address soft tissue restrictions, analyze and cue movement patterns, analyze and modify body mechanics/ergonomics and instruct in home and community integration  to attain remaining goals   Progress toward goals / Updated goals:    Progressing in HEP independence     PLAN    [x]  Upgrade activities as tolerated  [x]  Update interventions per flow sheet YES Continue plan of care   []  Discharge due to :    []  Other:      Therapist: Tracy Salazar \"BJ\" Josefina Naylor DPT, Cert. MDT, Cert. DN, Cert. SMT, Dip.  Osteopractic    Date: 8/17/2021 Time: 4:03 PM     Future Appointments   Date Time Provider Elizabeth Gage   8/17/2021  4:15 PM Maria Eugenia Aldana, PT [facility]   8/20/2021  8:45 AM Yakelin Rodrigez, PT [facility]

## 2021-08-20 ENCOUNTER — HOSPITAL ENCOUNTER (OUTPATIENT)
Dept: PHYSICAL THERAPY | Age: 31
Discharge: HOME | End: 2021-08-20
Payer: MEDICAID

## 2021-08-20 PROCEDURE — 97110 THERAPEUTIC EXERCISES: CPT

## 2021-08-20 PROCEDURE — 97530 THERAPEUTIC ACTIVITIES: CPT

## 2021-08-20 NOTE — PROGRESS NOTES
[facility]ICAL [facility]  [address] [facility] [place], [address] [employer] [address] - Phone: [phone]  Fax: [phone] SUMMARY  Patient Name: Teresita Schreiber : 1990   Treatment/Medical Diagnosis: Right knee pain [M25.561]   Referral Source: Layla Houston,*     Date of Initial Visit: 2021 Attended Visits: 10 Missed Visits: 1     SUMMARY OF TREATMENT  Treatment has consisted of stretching and strengthening exercises, neuromuscular reeducation, therapeutic activities, electrical stimulation for pain control, patient education, and home exercise program.  CURRENT STATUS  Patient reports no significant improvement in left knee pain since beginning therapy. \" I just wake up each day and wonder if that is the day that I am going to fall. My knee literally gives out every single day. \"     Right knee AROM: 0-135 deg (p! 2/10)                                                                          Strength (0-5)  Hip Left Right   Flexion 5 4+   Extension 4 4   Abduction 5 5   Adduction 4 4   ER 5 5   IR 5 5   Knee Left Right   Extension 5 4- p! Flexion 5 5      Thessaly: (+) right  Lachmans's: (-) right  Apley's compression test: (+) right      Goal/Measure of Progress Goal Met? 1. Patient to be Safe and Independent with HEP to self-manage/prevent symptoms after DC. Status at last Eval: Compliant with HEP Current Status: independent with HEP yes   2. Patient to increase FS FOTO score to > 70 to indicate increased functional independence. Status at last Eval: 49 Current Status: 41 no   3. Patient to demonstrate stair negotiate stairs without UE assist  4x3 to facilitate home ambulation. Status at last Eval: Difficulty negotiating steps Current Status: Negotiates steps with reciprocal gait pattern with intermittent UE support progressing     RECOMMENDATIONS  Discontinue therapy due to lack of appreciable progress towards goals.     If you have any questions/comments please contact us directly at [phone]. Thank you for allowing us to assist in the care of your patient. Therapist Signature: Evelin Altamirano, PT Date: 8/20/2021     Time: 12:12 PM   NOTE TO PHYSICIAN:  [address] AND FAX TO   [facility]: [phone]. If you are unable to process this request in 24 hours please contact our office: [phone].    ___ I have read the above report and request that my patient be discharged from therapy.      Physician Signature:        Date:       Time:                                        Temitope Corado,*

## 2021-08-20 NOTE — PROGRESS NOTES
PHYSICAL THERAPY - DAILY TREATMENT NOTE    Patient Name: Claudia Pat        Date: 2021  : 1990   YES Patient  Verified  Visit #:   10   of   12  Insurance: Payor: BLUE CROSS MEDICAID / Plan: [facility] [employer] [place] [employer] / Product Type: Managed Care Medicaid /      In time: 8:46 Out time: 9:20   Total Treatment Time: 34     Medicare/BCBS [place] Time Tracking (below)   Total Timed Codes (min):  34 1:1 Treatment Time:  34     TREATMENT AREA =   Right knee pain [M25.561]    SUBJECTIVE    Pain Level (on 0 to 10 scale):  0  / 10   Medication Changes/New allergies or changes in medical history, any new surgeries or procedures?    no    If yes, update Summary List   Subjective Functional Status/Changes:  []  No changes reported     Reports no improvement in symptoms since beginning therapy. States \"I am not really physically fit to work right now. I just wake up each day and wonder if that is the day that I am going to fall. My knee literally gives out every single day. \"          OBJECTIVE    24 min Therapeutic Exercise:  [x]  See flow sheet   Rationale:      increase ROM, increase strength, improve coordination, and increase proprioception to improve the patients ability to perform ADL's, gait, and functional mobility with decreased pain and improve joint mechanics. 10 min Therapeutic Activity: FOTO   Rationale:      increase ROM, increase strength, improve coordination, and increase proprioception to improve the patients ability to perform ADL's, gait, and functional mobility with decreased pain and improved joint mechanics. min Patient Education:  YES  Reviewed HEP   []  Progressed/Changed HEP based on:   Cont HEP     Other Objective/Functional Measures:    Right knee AROM: 0-135 deg (p! 2/10)                                          Strength (0-5)  Hip Left Right   Flexion 5 4+   Extension 4 4   Abduction 5 5   Adduction 4 4   ER 5 5   IR 5 5   Knee Left Right   Extension 5 4- p! Flexion 5 5     Thessaly: (+) right  Lachmans's: (-) right  Apley's compression test: (+) right    Negotiates steps with reciprocal gait pattern with intermittent UE support. Post Treatment Pain Level (on 0 to 10) scale:   0  / 10     ASSESSMENT    Assessment/Changes in Function:     See DC note to MD     []  See Progress Note/Recertification      Progress toward goals / Updated goals:    See DC note to MD     PLAN    []  Upgrade activities as tolerated YES Continue plan of care   [x]  Discharge due to : Lack of progress    []  Other:      Therapist: Nohemy Day PT    Date: 8/20/2021 Time: 8:50 AM     No future appointments.

## 2021-12-02 ENCOUNTER — HOSPITAL ENCOUNTER (OUTPATIENT)
Dept: PHYSICAL THERAPY | Age: 31
Discharge: HOME | End: 2021-12-02
Payer: MEDICAID

## 2021-12-02 PROCEDURE — 97162 PT EVAL MOD COMPLEX 30 MIN: CPT

## 2021-12-02 NOTE — PROGRESS NOTES
PHYSICAL THERAPY - DAILY TREATMENT NOTE    Patient Name: Malou Cobian        Date: 2021  : 1990   YES Patient  Verified  Visit #:     Insurance: Payor: BLUE CROSS MEDICAID / Plan: [facility] HEALTHKEEPERS PLUS / Product Type: Managed Care Medicaid /      In time: 7:55 Out time: 8:45   Total Treatment Time: 50     Medicare Time Tracking (below)   Total Timed Codes (min):  10 1:1 Treatment Time:  10     TREATMENT AREA =  Right knee    SUBJECTIVE    Pain Level (on 0 to 10 scale):  1  / 10   Medication Changes/New allergies or changes in medical history, any new surgeries or procedures? NO    If yes, update Summary List   Subjective Functional Status/Changes:  []  No changes reported     States she had a right medial meniscus repair, partial lateral menisectomy, and a microfracture to right medial femoral condyle on . States she initially injured her right knee on 2021 when she fell while rock climbing. Since surgery she has worn an immobilizer and using axillary crutches.           OBJECTIVE  Physical Therapy Evaluation - Knee        Gait:  [] Normal    [x] Abnormal    [] Antalgic    [] NWB    Device: axillary crutches (2)    Describe: decreased WB on right LE, brace locked in extension on right LE    ROM / Strength  [] Unable to assess                  AROM (deg)        PROM  (deg)             Strength (0-5)    Left Right Left Right Left Right   Hip Flexion     5 3    Extension     5 4    Abduction     5 4    Adduction         Knee Flexion 145 125   NT 5    Extension 0 0   5 2+   Ankle Plantarflexion          Dorsiflexion             Flexibility: [] Unable to assess at this time  Hamstrings:    (L) Tightness= [] WNL   [] Min   [x] Mod   [] Severe    (R) Tightness= [] WNL   [] Min   [x] Mod   [] Severe  Quadriceps:    (L) Tightness= [] WNL   [] Min   [] Mod   [] Severe    (R) Tightness= [] WNL   [] Min   [] Mod   [] Severe  Gastroc:      (L) Tightness= [x] WNL   [] Min   [] Mod   [] Severe    (R) Tightness= [] WNL   [x] Min   [] Mod   [] Severe  Other:    Palpation:   Neg/Pos  Neg/Pos  Neg/Pos   Joint Line pos Quad tendon  Patellar ligament    Patella  Fibular head  Pes Anserinus    Tibial tubercle  Hamstring tendons  Infrapatellar fat pad      Optional Tests:  Patellar Positioning (Static)   []L []R Normal []L []R Lateral   []L []R Francy Corky      []L []R Medial   []L []R [place]    Patellar Tracking   []L []R Glide (Lat)   []L []R Tilt (Lat)     []L []R Glide (Med)  []L []R Tilt (Med)      []L []R Tile (Inf)     Patellar Mobility   []L []R Hypermobile []L []R Hypomobile         Girth Measurements:     Cm at   Cm above joint line   Cm at    Cm below joint line  Cm at joint line   Left 35.0 5 29.5 5 30.5   Right  37.0 5 30.0 5 31.5        Other tests/comments:    Modalities Rationale:    decrease edema, decrease inflammation and decrease pain to improve patient's ability to perform ADL's, gait, and functional mobility with decreased pain. min [] Estim, type/location:                                      []  att     []  unatt     []  w/US     []  w/ice    []  w/heat    min []  Mechanical Traction: type/lbs                   []  pro   []  sup   []  int   []  cont    []  before manual    []  after manual    min []  Ultrasound, settings/location:      min []  Iontophoresis w/ dexamethasone, location:                                               []  take home patch       []  in clinic   10 min [x]  Ice     []  Heat    location/position: CP to right knee in supine    min []  Vasopneumatic Device, press/temp:     min []  Other:    [] Skin assessment post-treatment (if applicable):    []  intact    []  redness- no adverse reaction     []redness  adverse reaction:      10 min Therapeutic Exercise:  [x]  See flow sheet   Rationale:      increase ROM and increase strength to improve the patients ability to perform ADL's, gait, and functional mobility with decreased pain. min Patient Education:  YES  Reviewed HEP   []  Progressed/Changed HEP based on:   Issued HEP per handout; advised patient to continue wearing brace (unlocked) and using crutches      Other Objective/Functional Measures:    See eval    Unlocked right knee brace per protocol. Post Treatment Pain Level (on 0 to 10) scale:   1  / 10     ASSESSMENT  Justification for Eval Code Complexity:  Patient History (low 0, mod 1-2, high 3-4): high (arthritis, back pain, headaches, prior ACL repair which was referred to as nonviable inoperative report)   Examination (low 1-2, mod 3+, high 4+): mod (see above)  Clinical Presentation (low stable or uncomplicated, mod evolving or changing, high unstable or unpredictable): mod  Clinical Decision Making (low , mod 26-74, high 1-25): FOTO = 29/100 mod   []  See Progress Note/Recertification   Patient will continue to benefit from skilled PT services to modify and progress therapeutic interventions, address functional mobility deficits, address ROM deficits, address strength deficits, analyze and address soft tissue restrictions, analyze and cue movement patterns, analyze and modify body mechanics/ergonomics, assess and modify postural abnormalities and instruct in home and community integration to attain remaining goals. Progress toward goals / Updated goals:    Goals established.       PLAN    [x]  Upgrade activities as tolerated YES Continue plan of care   []  Discharge due to :    []  Other:      Therapist: Yuli Yoder PT    Date: 12/2/2021 Time: 7:58 AM     Future Appointments   Date Time Provider Elizabeth Gage   12/2/2021  8:00 AM Maria Del Carmen Harrell PT [facility]

## 2021-12-02 NOTE — PROGRESS NOTES
[address] PHYSICAL THERAPY  [address], [place], [address] - Phone: [phone]  Fax: [phone] / [address] [facility]  Patient Name: Javan Patel : 1990   Medical   Diagnosis: Right knee pain [M25.561] Treatment Diagnosis: Right knee medial meniscus repair, partial lateral menisectomy, microfracture to medial femoral condyle    Onset Date: 2021 DOS     Referral Source: Marti Kennedy,  Start of Care [facility]): 2021   Prior Hospitalization: See medical history Provider #: [id number]   Prior Level of Function: Independent, previous PT for right knee pain   Comorbidities: arthritis, back pain, headaches, prior ACL repair which was referred to as nonviable inoperative report   Medications: Verified on Patient Summary List   The Plan of Care and following information is based on the information from the initial evaluation.   ===========================================================================================  Assessment / key information:  Patient is a 28y.o. year old female s/p right medial meniscal repair, partial lateral menisectomy, and microfracture to right medical condyle. Patient currently ambulating with a pair of axillary crutches and immobilizer locked in extension. The brace was unlocked today as per protocol. Functional limitations: 1) difficulty with gait and functional mobility around her home due to axillary crutches. Objective findings: 1) decreased right knee flexion, 2) decreased strength in right LE, 3) decreased hamstring flexibility, 4) mild edema in right knee, 5) restricted gait. Patient will benefit from skilled PT services to address these issues. Thank you for your referral.     Iraj Soto (Focused on Therapeutic Outcomes) Functional Status score = 29/100, which corresponds to a functional limitation of 71%. ROM / Strength  []?  Unable to assess AROM (deg)        PROM  (deg)             Strength (0-5)      Left Right Left Right Left Right   Hip Flexion         5 3     Extension         5 4     Abduction         5 4     Adduction               Knee Flexion 145 125     NT 5     Extension 0 0     5 2+     Girth Measurements:      Cm at   Cm above joint line   Cm at    Cm below joint line  Cm at joint line   Left 35.0 5 29.5 5 30.5   Right  37.0 5 30.0 5 31.5     Gait:                []? Normal    [x]? Abnormal    []? Antalgic    []?  NWB    Device: axillary crutches (2)                          Describe: decreased WB on right LE, brace locked in extension on right LE  ===========================================================================================  Eval Complexity: History: HIGH Complexity :3+ comorbidities / personal factors will impact the outcome/ POC Exam:MEDIUM Complexity : 3 Standardized tests and measures addressing body structure, function, activity limitation and / or participation in recreation  Presentation: MEDIUM Complexity : Evolving with changing characteristics  Clinical Decision Making:MEDIUM Complexity : FOTO score of 26-74Overall Complexity:MEDIUM    Problem List: pain affecting function, decrease ROM, decrease strength, edema affecting function, impaired gait/ balance, decrease ADL/ functional abilitiies, decrease activity tolerance, decrease flexibility/ joint mobility and decrease transfer abilities   Treatment Plan may include any combination of the following: Therapeutic exercise, Therapeutic activities, Neuromuscular re-education, Physical agent/modality, Gait/balance training, Manual therapy and Patient education  Patient / Family readiness to learn indicated by: asking questions, trying to perform skills and interest  Persons(s) to be included in education: patient (P)  Barriers to Learning/Limitations: None  Measures taken:    Patient Goal (s): \"stregnthen right knee\"   Patient self reported health status: good  Rehabilitation Potential: good   Short Term Goals: To be accomplished in  3-4  weeks:  1. Patient will increase AROM with right knee flexion to 140 degrees to improve positional tolerance. 2.  Patient will increase strength with left knee extension to 3/5 to increase dynamic stability with gait. 3. Patient will be compliant with home exercise program.     Long Term Goals: To be accomplished in  6-8  weeks:  1. Patient will increase FOTO Functional Status score to 60/100 to decrease functional limitations. 2.  Patient will increase strength with right knee extension to 4/5 degrees to improve dynamic stability with gait and functional activities. 3.  Patient will demonstrate gait without brace or assistive device with normal mechanics to return to PLOF and allow her to perform ADL's/IADL's with increased safety/independence and decreased pain. 4.  Patient will be independent with home exercise program in preparation for discharge. Frequency / Duration:   Patient to be seen  2-3  times per week for 6-8  weeks:  Patient / Caregiver education and instruction: self care, brace/ splint application and exercises    Therapist Signature: Shahla Gudino PT Date: 13/0/8014   Certification Period: NA Time: 9:06 AM   ===========================================================================================  I certify that the above Physical Therapy Services are being furnished while the patient is under my care. I agree with the treatment plan and certify that this therapy is necessary. Physician Signature:        Date:       Time:                                         Felipe Ross,     Please sign and return to [employer] or you may fax the signed copy to [phone]. Thank you. To ensure your patient receives the highest quality care and to avoid disruption in therapy please sign and return this plan of care within 21 days.   Per Medicaid guidelines if the plan of care is not received within 21 days the patient's care must be put on hold until signed.

## 2021-12-06 ENCOUNTER — HOSPITAL ENCOUNTER (OUTPATIENT)
Dept: PHYSICAL THERAPY | Age: 31
Discharge: HOME | End: 2021-12-06
Payer: MEDICAID

## 2021-12-06 PROCEDURE — 97110 THERAPEUTIC EXERCISES: CPT

## 2021-12-06 PROCEDURE — 97530 THERAPEUTIC ACTIVITIES: CPT

## 2021-12-06 PROCEDURE — 97112 NEUROMUSCULAR REEDUCATION: CPT

## 2021-12-06 NOTE — PROGRESS NOTES
PHYSICAL THERAPY - DAILY TREATMENT NOTE    Patient Name: Lalita Cadena        Date: 2021  : 1990   YES Patient  Verified  Visit #:   2     Insurance: Payor: BLUE CROSS MEDICAID / Plan: [facility] [employer] / Product Type: Managed Care Medicaid /      In time: 10:18 Out time: 11:00   Total Treatment Time: 42     Medicare/BCBS [place] Time Tracking (below)   Total Timed Codes (min):  na 1:1 Treatment Time:  na     TREATMENT AREA =  Right knee pain [M25.561]    SUBJECTIVE    Pain Level (on 0 to 10 scale):  1  / 10   Medication Changes/New allergies or changes in medical history, any new surgeries or procedures? NO    If yes, update Summary List   Subjective Functional Status/Changes:  []  No changes reported     Pt reports she walks without the crutches for short distances at home. Pt states her knee hasn't been swelling much, has a little pain if she walks too much. OBJECTIVE    12 min Therapeutic Exercise:  [x]  See flow sheet   Rationale:    increase ROM, increase strength, improve coordination, improve balance and increase proprioception to promote increased functional mobility and increased activity tolerance with ADL's. 10 min Therapeutic Activity: see flow sheet    Rationale:    increase ROM, increase strength, improve coordination, improve balance and increase proprioception to promote increased functional mobility and increased activity tolerance with ADL's. 20 min Neuromuscular Re-ed: see flow sheet    Rationale:     improve coordination, improve balance and increase proprioception to improve the patients ability to perform ADLs, transfers and gait safely and independently. min Patient Education:  YES  Reviewed HEP   []  Progressed/Changed HEP based on:   Advised pt protocol is for ambulation with WBing as tolerated with B axillary crutches as tolerated. Pt verbalized understanding.       Other Objective/Functional Measures:    Pt ambulating with good heel to toe gait mechanics with B axillary crutches and right knee brace. Pt is able to perform sit<>stand and bed mobility without c/o. Initiated LE strengthening and ROM exercises per flow sheet. Mod VCing for form. Pt demo's good quad contraction with quad sets and no quad lag noted during SLR. Post Treatment Pain Level (on 0 to 10) scale:   1  / 10     ASSESSMENT    Assessment/Changes in Function:     Pt with good tolerance to LE strengthening and ROM exercises. []  See Progress Note/Recertification   Patient will continue to benefit from skilled PT services to modify and progress therapeutic interventions, address functional mobility deficits, address ROM deficits, address strength deficits, analyze and address soft tissue restrictions, analyze and cue movement patterns, assess and modify postural abnormalities, and instruct in home and community integration to attain remaining goals. Progress toward goals / Updated goals:    1. Patient will increase AROM with right knee flexion to 140 degrees to improve positional tolerance. heel slides for knee flexion ROM   2. Patient will increase strength with left knee extension to 3/5 to increase dynamic stability with gait.   3. Patient will be compliant with home exercise program.     PLAN    []  Upgrade activities as tolerated YES Continue plan of care   []  Discharge due to :    []  Other:      Therapist: Wm Johnson PTA    Date: 12/6/2021 Time: 10:44 AM     Future Appointments   Date Time Provider Elizabeth Gage   12/8/2021 11:00 AM Angelica Steven PT [facility]   12/10/2021 11:00 AM Angelica Steven PT [facility]   12/13/2021 11:00 AM Charbel Devoid [facility]   12/15/2021  9:30 AM Angelica Steven PT [facility]   12/17/2021 11:00 AM Angelica Steven PT [facility]   12/20/2021 11:00 AM Angelica Steven PT [facility]   12/22/2021 12:30 PM [facility]   12/29/2021 11:45 AM Nick Panda [facility]

## 2021-12-08 ENCOUNTER — HOSPITAL ENCOUNTER (OUTPATIENT)
Dept: PHYSICAL THERAPY | Age: 31
Discharge: HOME | End: 2021-12-08
Payer: MEDICAID

## 2021-12-08 PROCEDURE — 97110 THERAPEUTIC EXERCISES: CPT

## 2021-12-08 PROCEDURE — 97112 NEUROMUSCULAR REEDUCATION: CPT

## 2021-12-08 PROCEDURE — 97530 THERAPEUTIC ACTIVITIES: CPT

## 2021-12-08 NOTE — PROGRESS NOTES
PHYSICAL THERAPY - DAILY TREATMENT NOTE    Patient Name: Luis Ferrera        Date: 2021  : 1990   YES Patient  Verified  Visit #:   3     Insurance: Payor: BLUE CROSS MEDICAID / Plan: [facility] [place] / Product Type: Managed Care Medicaid /      In time: 11:00 Out time: 11:55   Total Treatment Time: 55     Medicare/BCBS [place] Time Tracking (below)   Total Timed Codes (min):  45 1:1 Treatment Time:  45     TREATMENT AREA =  Right knee pain [M25.561]    SUBJECTIVE    Pain Level (on 0 to 10 scale):  0  / 10   Medication Changes/New allergies or changes in medical history, any new surgeries or procedures?    no    If yes, update Summary List   Subjective Functional Status/Changes:  []  No changes reported     \"I have been walking without my crutches at home. \" provided education on the purpose of crutches to allow healing time and protect surgical repair. OBJECTIVE    Modalities Rationale:  decrease edema, decrease inflammation and decrease pain to improve patient's ability to perform ADL's, gait, and functional mobility with decreased pain.      min [] Estim, type/location:                                      []  att     []  unatt     []  w/US     []  w/ice    []  w/heat    min []  Mechanical Traction: type/lbs                   []  pro   []  sup   []  int   []  cont    []  before manual    []  after manual    min []  Ultrasound, settings/location:      min []  Iontophoresis w/ dexamethasone, location:                                               []  take home patch       []  in clinic   10 min [x]  Ice     []  Heat    location/position: CP to right knee in supine    min []  Vasopneumatic Device, press/temp:     min []  Other:    [x] Skin assessment post-treatment (if applicable):    [x]  intact    []  redness- no adverse reaction     []redness  adverse reaction:      12 min Therapeutic Exercise:  [x]  See flow sheet   Rationale:      increase ROM, increase strength, improve coordination, and increase proprioception to improve the patients ability to perform ADL's, gait, and functional mobility with decreased pain and improved joint mechanics. 13 min Therapeutic Activity: See flowsheet   Rationale:      increase ROM, increase strength, improve coordination, and increase proprioception to improve the patients ability to perform ADL's, gait, and functional mobility with decreased pain and improved joint mechanics. 20 min Neuromuscular Re-ed: See flowsheet   Rationale:      increase ROM, increase strength, improve coordination, improve balance, and increase proprioception to improve the patients ability to perform ADL's, gait, and functional mobility with decreased pain and improved joint mechanics. min Patient Education:  YES  Reviewed HEP   []  Progressed/Changed HEP based on:   Educated patient on surgical repair and healing time/activity modification required to protect surgical repair;   Progressed HEP per handout     Other Objective/Functional Measures:    Increased reps with SLR x 3 and added S/L hip adduction. Added standing heel/toe raises and standing gastroc stretch on incline board. Added SAQ    Increased muscle turgor noted in right gluteals/piriformis. Patellar mobs      Post Treatment Pain Level (on 0 to 10) scale:   0  / 10     ASSESSMENT    Assessment/Changes in Function:     Patient verbalized good understanding of surgical precautions. Right patellar mobility is WNL     []  See Progress Note/Recertification   Patient will continue to benefit from skilled PT services to modify and progress therapeutic interventions, address functional mobility deficits, address ROM deficits, address strength deficits, analyze and address soft tissue restrictions, analyze and cue movement patterns, analyze and modify body mechanics/ergonomics and instruct in home and community integration to attain remaining goals.      Progress toward goals / Updated goals: 1.  Patient will increase AROM with right knee flexion to 140 degrees to improve positional tolerance. 2.  Patient will increase strength with left knee extension to 3/5 to increase dynamic stability with gait. Added SAQ  3.  Patient will be compliant with home exercise program.  Reports compliance with HEP     PLAN    [x]  Upgrade activities as tolerated YES Continue plan of care   []  Discharge due to :    []  Other:      Therapist: Kim Garza PT    Date: 12/8/2021 Time: 11:08 AM     Future Appointments   Date Time Provider Elizabeth Gage   12/10/2021 11:00 AM Luciano Hale PT [facility]   12/13/2021 11:00 AM Dequan Ybarra [facility]   12/15/2021  9:30 AM Luciano Hale PT [facility]   12/17/2021 11:00 AM Luciano Hale PT [facility]   12/20/2021 11:00 AM Luciano Hale PT [facility]   12/22/2021 12:30 PM Luciano Hale PT [facility]   12/29/2021 11:45 AM Dequan [facility] Statement Selected

## 2021-12-10 ENCOUNTER — HOSPITAL ENCOUNTER (OUTPATIENT)
Dept: PHYSICAL THERAPY | Age: 31
Discharge: HOME | End: 2021-12-10
Payer: MEDICAID

## 2021-12-10 PROCEDURE — 97110 THERAPEUTIC EXERCISES: CPT

## 2021-12-10 PROCEDURE — 97112 NEUROMUSCULAR REEDUCATION: CPT

## 2021-12-10 PROCEDURE — 97530 THERAPEUTIC ACTIVITIES: CPT

## 2021-12-10 NOTE — PROGRESS NOTES
PHYSICAL THERAPY - DAILY TREATMENT NOTE    Patient Name: Hollie Crocker        Date: 12/10/2021  : 1990   YES Patient  Verified  Visit #:   4     Insurance: Payor: BLUE CROSS MEDICAID / Plan: [facility] [place] / Product Type: Managed Care Medicaid /      In time: 11:00 Out time: 11:44   Total Treatment Time: 44     Medicare/BCBS [place] Time Tracking (below)   Total Timed Codes (min):  44 1:1 Treatment Time:  44     TREATMENT AREA =  Right knee pain [M25.561]    SUBJECTIVE    Pain Level (on 0 to 10 scale):  1  / 10   Medication Changes/New allergies or changes in medical history, any new surgeries or procedures?    no    If yes, update Summary List   Subjective Functional Status/Changes:  []  No changes reported     \"The cold weather bothers my knee. I think it is the arthritis. \"   \"I can do these exercises now without pain. I couldn't do that before surgery. \"         OBJECTIVE    19 min Therapeutic Exercise:  [x]  See flow sheet   Rationale:      increase ROM, increase strength, improve coordination, and increase proprioception to improve the patients ability to perform ADL's, gait, and functional mobility with decreased pain and improved joint mechanics. 10 min Therapeutic Activity: See flowsheet   Rationale:      increase ROM, increase strength, improve coordination, and increase proprioception to improve the patients ability to perform ADL's, gait, and functional mobility with decreased pain and improved joint mechanics. 15 min Neuromuscular Re-ed: See flowsheet   Rationale:      increase ROM, increase strength, improve coordination, improve balance, and increase proprioception to improve the patients ability to perform ADL's, gait, and functional mobility with decreased pain and improved joint mechanics.       min Patient Education:  YES  Reviewed HEP   []  Progressed/Changed HEP based on:   Cont HEP     Other Objective/Functional Measures:    Patient continues to use B axillary crutches for ambulation. Added prone knee flexion (patient able to perform this without c/o's pain). Increased resistance with H/L hip abd and S/L clam.  Increased reps with SAQ     Post Treatment Pain Level (on 0 to 10) scale:   1  / 10     ASSESSMENT    Assessment/Changes in Function:     Reported B LE muscle fatigue with exercises. Tolerated exercises without complaints of pain. []  See Progress Note/Recertification   Patient will continue to benefit from skilled PT services to modify and progress therapeutic interventions, address functional mobility deficits, address ROM deficits, address strength deficits, analyze and address soft tissue restrictions, analyze and cue movement patterns, analyze and modify body mechanics/ergonomics, assess and modify postural abnormalities and instruct in home and community integration to attain remaining goals. Progress toward goals / Updated goals:    1.  Patient will increase AROM with right knee flexion to 140 degrees to improve positional tolerance. 2.  Patient will increase strength with left knee extension to 3/5 to increase dynamic stability with gait. increased reps with SAQ  3.  Patient will be compliant with home exercise program.  Reports compliance with HEP     PLAN    [x]  Upgrade activities as tolerated YES Continue plan of care   []  Discharge due to :    [x]  Other: Decrease frequency to 2x/week      Therapist: Zoila Singh PT    Date: 12/10/2021 Time: 11:11 AM     Future Appointments   Date Time Provider Elizabeth Gage   12/15/2021  9:30 AM Mikaela Morris PT [facility]   12/17/2021 11:00 AM Mikaela Morris PT [facility]   12/20/2021 11:00 AM Mikaela Morris PT [facility]   12/22/2021 12:30 PM Mikaela Morris PT [facility]   12/29/2021 11:45 AM Eva [facility]

## 2021-12-13 ENCOUNTER — APPOINTMENT (OUTPATIENT)
Dept: PHYSICAL THERAPY | Age: 31
End: 2021-12-13
Payer: MEDICAID

## 2021-12-15 ENCOUNTER — HOSPITAL ENCOUNTER (OUTPATIENT)
Dept: PHYSICAL THERAPY | Age: 31
Discharge: HOME | End: 2021-12-15
Payer: MEDICAID

## 2021-12-15 PROCEDURE — 97112 NEUROMUSCULAR REEDUCATION: CPT

## 2021-12-15 PROCEDURE — 97530 THERAPEUTIC ACTIVITIES: CPT

## 2021-12-15 PROCEDURE — 97110 THERAPEUTIC EXERCISES: CPT

## 2021-12-15 NOTE — PROGRESS NOTES
PHYSICAL THERAPY - DAILY TREATMENT NOTE    Patient Name: Lefty Valdes        Date: 12/15/2021  : 1990   YES Patient  Verified  Visit #:     Insurance: Payor: BLUE CROSS MEDICAID / Plan: [facility] / Product Type: Managed Care Medicaid /      In time: 9:30 Out time: 10:15   Total Treatment Time: 45     Medicare/BCBS [place] Time Tracking (below)   Total Timed Codes (min):  35 1:1 Treatment Time:  35     TREATMENT AREA =  Right knee pain [M25.561]    SUBJECTIVE    Pain Level (on 0 to 10 scale):  1  / 10   Medication Changes/New allergies or changes in medical history, any new surgeries or procedures?    no    If yes, update Summary List   Subjective Functional Status/Changes:  []  No changes reported     Reports compliance with HEP          OBJECTIVE    Modalities Rationale:  decrease edema, decrease inflammation and decrease pain to improve patient's ability to perform ADL's, gait, and functional mobility with decreased pain. min [] Estim, type/location:                                      []  att     []  unatt     []  w/US     []  w/ice    []  w/heat    min []  Mechanical Traction: type/lbs                   []  pro   []  sup   []  int   []  cont    []  before manual    []  after manual    min []  Ultrasound, settings/location:      min []  Iontophoresis w/ dexamethasone, location:                                               []  take home patch       []  in clinic   10 min [x]  Ice     []  Heat    location/position: CP to right knee in supine    min []  Vasopneumatic Device, press/temp:     min []  Other:    [x] Skin assessment post-treatment (if applicable):    [x]  intact    []  redness- no adverse reaction     []redness  adverse reaction:      15 min Therapeutic Exercise:  [x]?   See flow sheet   Rationale:      increase ROM, increase strength, improve coordination, and increase proprioception to improve the patients ability to perform ADL's, gait, and functional mobility with decreased pain and improved joint mechanics.    10 min Therapeutic Activity: See flowsheet   Rationale:      increase ROM, increase strength, improve coordination, and increase proprioception to improve the patients ability to perform ADL's, gait, and functional mobility with decreased pain and improved joint mechanics. 10 min Neuromuscular Re-ed: See flowsheet   Rationale:      increase ROM, increase strength, improve coordination, improve balance, and increase proprioception to improve the patients ability to perform ADL's, gait, and functional mobility with decreased pain and improved joint mechanics. min Patient Education:  YES  Reviewed HEP   []  Progressed/Changed HEP based on:   Cont HEP     Other Objective/Functional Measures:    Progressed resistance with SLR x 4 per flowsheet. Attempted to add partial wall sits (to 30 deg) but patient reported pain in right knee on 3rd rep; therefore, held further reps. Pain alleviated upon cessation of exercise. Added standing TKE and supine hamstring stretch (gentle)     Post Treatment Pain Level (on 0 to 10) scale:   0  / 10     ASSESSMENT    Assessment/Changes in Function:     Unable to progress to closed chain quad exercises pre protocol due to c/o's right knee pain. []  See Progress Note/Recertification   Patient will continue to benefit from skilled PT services to modify and progress therapeutic interventions, address functional mobility deficits, address ROM deficits, address strength deficits, analyze and address soft tissue restrictions, analyze and cue movement patterns, analyze and modify body mechanics/ergonomics and instruct in home and community integration to attain remaining goals. Progress toward goals / Updated goals:    1.  Patient will increase AROM with right knee flexion to 140 degrees to improve positional tolerance.   2.  Patient will increase strength with left knee extension to 3/5 to increase dynamic stability with gait.  cont SAQ  3.  Patient will be compliant with home exercise program.  Reports compliance with HEP     PLAN    [x]  Upgrade activities as tolerated YES Continue plan of care   []  Discharge due to :    []  Other:      Therapist: Mickey Lane, PT    Date: 12/15/2021 Time: 9:35 AM     Future Appointments   Date Time Provider Elizabeth Gage   12/17/2021 11:00 AM Pablo Felder PT [facility]ael[address] Edison Zarate   12/20/2021 11:00 AM Pablo Felder PT [facility] Kiko Zarate   12/22/2021 12:30 PM Pablo Felder PT [facility] K[facility] Zarate   12/29/2021 11:45 AM Mali Luis [facility] Edison Zarate

## 2021-12-17 ENCOUNTER — APPOINTMENT (OUTPATIENT)
Dept: PHYSICAL THERAPY | Age: 31
End: 2021-12-17
Payer: MEDICAID

## 2021-12-20 ENCOUNTER — HOSPITAL ENCOUNTER (OUTPATIENT)
Dept: PHYSICAL THERAPY | Age: 31
Discharge: HOME | End: 2021-12-20
Payer: MEDICAID

## 2021-12-20 PROCEDURE — 97112 NEUROMUSCULAR REEDUCATION: CPT

## 2021-12-20 PROCEDURE — 97530 THERAPEUTIC ACTIVITIES: CPT

## 2021-12-20 PROCEDURE — 97110 THERAPEUTIC EXERCISES: CPT

## 2021-12-20 NOTE — PROGRESS NOTES
PHYSICAL THERAPY - DAILY TREATMENT NOTE    Patient Name: Maxwell Kamara        Date: 2021  : 1990   YES Patient  Verified  Visit #:     Insurance: Payor: BLUE CROSS MEDICAID / Plan: [address] [place] [address] / Product Type: Managed Care Medicaid /      In time: 10:57 Out time: 11:35   Total Treatment Time: 38     Medicare/BCBS [place] Time Tracking (below)   Total Timed Codes (min):  38 1:1 Treatment Time:  38     TREATMENT AREA = Right knee pain [M25.561]    SUBJECTIVE    Pain Level (on 0 to 10 scale):  0  / 10   Medication Changes/New allergies or changes in medical history, any new surgeries or procedures?    no    If yes, update Summary List   Subjective Functional Status/Changes:  []  No changes reported     Reports compliance with HEP. No c/o's pain in right knee. OBJECTIVE    14 min Therapeutic Exercise:  [x]  See flow sheet   Rationale:      increase ROM, increase strength, improve coordination, and increase proprioception to improve the patients ability to perform ADL's, gait, and functional mobility with decreased pain and improved joint mechanics. 10 min Therapeutic Activity: See flowsheet   Rationale:      [id number] ROM, increase strength, improve coordination, and increase proprioception to improve the patients ability to perform ADL's, gait, and functional mobility with decreased pain and improved joint mechanics. 13 min Neuromuscular Re-ed: See flwowsheet   Rationale:      increase ROM, increase strength, improve coordination, improve balance, and increase proprioception to improve the patients ability to perform ADL's, gait, and functional mobility with decreased pain and improved joint mechanics. 1 min Manual Therapy: Patellar mobs   Rationale:      decrease pain, increase ROM, and increase tissue extensibility to improve patient's ability to perform ADL's, gait, and functional mobility with decreased pain and improved joint mechanics. The manual therapy interventions were performed at a separate and distinct time from the therapeutic activities interventions. min Patient Education:  YES  Reviewed HEP   []  Progressed/Changed HEP based on:   Cont HEP     Other Objective/Functional Measures:    Able to add partial wall squats today (to 45 deg knee flexion) without reports of pain. Right knee AROM: 2 - 135 deg    Patellar mobility is good in right LE     Post Treatment Pain Level (on 0 to 10) scale:   0  / 10     ASSESSMENT    Assessment/Changes in Function:     Patient able to perform all exercises without complaints of pain today. []  See Progress Note/Recertification   Patient will continue to benefit from skilled PT services to modify and progress therapeutic interventions, address functional mobility deficits, address ROM deficits, address strength deficits, analyze and address soft tissue restrictions, analyze and cue movement patterns, analyze and modify body mechanics/ergonomics and instruct in home and community integration to attain remaining goals. Progress toward goals / Updated goals:    1.  Patient will increase AROM with right knee flexion to 140 degrees to improve positional tolerance. Right knee AROM flexion to 135 deg. 2.  Patient will increase strength with left knee extension to 3/5 to increase dynamic stability with gait.    3.  Patient will be compliant with home exercise program.  Reports continued compliance with HEP     PLAN    [x]  Upgrade activities as tolerated YES Continue plan of care   []  Discharge due to :    []  Other:      Therapist: Darvin Mcconnell PT    Date: 12/20/2021 Time: 11:02 AM     Future Appointments   Date Time Provider Elizabeth Gage   12/22/2021 12:30 PM Denis Clement, JEANNE [facility]   12/29/2021 11:45 AM Darren MCCORMICK [facility]

## 2021-12-22 ENCOUNTER — HOSPITAL ENCOUNTER (OUTPATIENT)
Dept: PHYSICAL THERAPY | Age: 31
Discharge: HOME | End: 2021-12-22
Payer: MEDICAID

## 2021-12-22 PROCEDURE — 97112 NEUROMUSCULAR REEDUCATION: CPT

## 2021-12-22 PROCEDURE — 97110 THERAPEUTIC EXERCISES: CPT

## 2021-12-22 PROCEDURE — 97530 THERAPEUTIC ACTIVITIES: CPT

## 2021-12-22 NOTE — PROGRESS NOTES
[facility] PHYSICAL THERAPY  [address] [place] [address] Himanshu Zavala, [address] - Phone: [phone]  Fax: [phone]  PROGRESS NOTE  Patient Name: Julio Sunshine : 1990   Treatment/Medical Diagnosis: Right knee pain [M25.561]   Referral Source: Mandy Lopez DO     Date of Initial Visit: 2021 Attended Visits: 7 Missed Visits: 0     SUMMARY OF TREATMENT  Treatment has consisted of ROM and strengthening exercises per protocol, patient education regarding surgical precautions and tissue healing, and home exercise program.    CURRENT STATUS  Patient is progressing very well with physical therapy. Pain in right knee ranges from 0/10 - 1/10. Right knee AROM is now 0 - 140 deg without pain. Strength in right LE is progressing. Exercises have been limited to allow tissue healing for the posterior horn/medial meniscus. We have been able to progress exercises per protocol without pain provocation. However, patient does report muscular fatigue with exercises. Right knee AROM: 0 - 140 deg                                                                          Strength (0-5)  Hip Left Right   Flexion 5 4   Extension 5 4+   Abduction 5 5   Adduction       ER       IR       Knee Left Right   Extension 5 3+   Flexion 5 4         Goal/Measure of Progress Goal Met? 1. Patient will increase AROM with right knee flexion to 140 degrees to improve positional tolerance. Status at last Eval: 125 deg Current Status: 140 deg yes   2. Patient will increase strength with left knee extension to 3/5 to increase dynamic stability with gait. Status at last Eval: 2+/5 Current Status: 3+/5 yes   3. Patient will be compliant with home exercise program.   Status at last Eval: NA Current Status: Compliant with HEP yes     New Goals to be achieved in __4__  weeks:  1. Patient will increase FOTO Functional Status score to 60/100 to decrease functional limitations.    2. Patient will increase strength with right knee extension to 4/5 degrees to improve dynamic stability with gait and functional activities. 3.  Patient will demonstrate gait without brace or assistive device with normal mechanics to return to PLOF and allow her to perform ADL's/IADL's with increased safety/independence and decreased pain. 4.  Patient will be independent with home exercise program in preparation for discharge. RECOMMENDATIONS  Continue skilled physical therapy to progress closed chain exercises and single limb activities per protocol to return patient to previous level of function. If you have any questions/comments please contact us directly at [phone]. Thank you for allowing us to assist in the care of your patient. Therapist Signature: Taz Sheets PT Date: 12/22/2021     Time: 12:31 PM   NOTE TO PHYSICIAN:  PLEASE COMPLETE THE ORDERS BELOW AND FAX TO   [facility]: [phone]. If you are unable to process this request in 24 hours please contact our office: [phone].    ___ I have read the above report and request that my patient continue as recommended.   ___ I have read the above report and request that my patient continue therapy with the following changes/special instructions:_________________________________________________________   ___ I have read the above report and request that my patient be discharged from therapy.      Physician Signature:        Date:       Time:                                         Mickey Holly DO

## 2021-12-22 NOTE — PROGRESS NOTES
PHYSICAL THERAPY - DAILY TREATMENT NOTE    Patient Name: Mia Scherer        Date: 2021  : 1990   YES Patient  Verified  Visit #:     Insurance: Payor: BLUE CROSS MEDICAID / [facility] / Product Type: Managed Care Medicaid /      In time: 12:21 Out time: 1:01   Total Treatment Time: 40     Medicare/BCBS [place] Time Tracking (below)   Total Timed Codes (min):  40 1:1 Treatment Time:  40     TREATMENT AREA =  Right knee pain [M25.561]    SUBJECTIVE    Pain Level (on 0 to 10 scale):  1  / 10   Medication Changes/New allergies or changes in medical history, any new surgeries or procedures?    no    If yes, update Summary List   Subjective Functional Status/Changes:  []  No changes reported     Reports continued compliance with HEP. OBJECTIVE    20 min Therapeutic Exercise:  [x]  See flow sheet   Rationale:      increase ROM, increase strength, improve coordination, and increase proprioception to improve the patients ability to perform ADL's, gait, and functional mobility with decreased pain and improved joint mechanics. 10 min Therapeutic Activity: See flowsheet   Rationale:      increase ROM, increase strength, improve coordination, and increase proprioception to improve the patients ability to perform ADL's, gait, and functional mobility with decreased pain and improved joint mechanics. 10 min Neuromuscular Re-ed: See flowsheet   Rationale:      increase ROM, increase strength, improve coordination, improve balance, and increase proprioception to improve the patients ability to perform ADL's, gait, and functional mobility with decreased pain and improved joint mechanics. min Patient Education:  YES  Reviewed HEP   []  Progressed/Changed HEP based on:         Other Objective/Functional Measures:    Right knee AROM: 0 - 140 deg                                          Strength (0-5)  Hip Left Right   Flexion 5 4   Extension 5 4+ Abduction 5 5   Adduction     ER     IR     Knee Left Right   Extension 5 3+   Flexion 5 4   Ankle     DF     PF     Inversion     Eversion              Post Treatment Pain Level (on 0 to 10) scale:   0  / 10     ASSESSMENT    Assessment/Changes in Function:     See PN to MD     []  See Progress Note/Recertification   Patient will continue to benefit from skilled PT services to modify and progress therapeutic interventions, address functional mobility deficits, address ROM deficits, address strength deficits, analyze and address soft tissue restrictions, analyze and cue movement patterns, analyze and modify body mechanics/ergonomics, assess and modify postural abnormalities and instruct in home and community integration to attain remaining goals.      Progress toward goals / Updated goals:    See PN to MD     PLAN    [x]  Upgrade activities as tolerated YES Continue plan of care   []  Discharge due to :    []  Other:      Therapist: Agustin Madrigal PT    Date: 12/22/2021 Time: 12:25 PM     Future Appointments   Date Time Provider Elizabeth Gage   12/22/2021 12:30 PM Mumtaz Jernigan PT [facility]   12/29/2021 11:45 AM Jeffrey MCCORMICK [facility]

## 2021-12-29 ENCOUNTER — HOSPITAL ENCOUNTER (OUTPATIENT)
Dept: PHYSICAL THERAPY | Age: 31
Discharge: HOME | End: 2021-12-29
Payer: MEDICAID

## 2021-12-29 PROCEDURE — 97530 THERAPEUTIC ACTIVITIES: CPT

## 2021-12-29 PROCEDURE — 97112 NEUROMUSCULAR REEDUCATION: CPT

## 2021-12-29 PROCEDURE — 97110 THERAPEUTIC EXERCISES: CPT

## 2021-12-29 NOTE — PROGRESS NOTES
PHYSICAL THERAPY - DAILY TREATMENT NOTE    Patient Name: Jeffrey Alicea        Date: 2021  : 1990   yes Patient  Verified  Visit #:     Insurance: Payor: Loly Mckeon / Plan: [facility] [employer]KEEPERS PLUS / Product Type: Managed Care Medicaid /      In time: 8501 Out time: 1250   Total Treatment Time: 65     TREATMENT AREA =  Right knee pain [M25.561]    SUBJECTIVE  Pain Level (on 0 to 10 scale):  2  / 10   Medication Changes/New allergies or changes in medical history, any new surgeries or procedures?    no  If yes, update Summary List   Subjective Functional Status/Changes:  []  No changes reported     \"I over did it yesterday. I am ok, just sore. I been doing my HEP.  I have an ACL repair surgery on . \"          OBJECTIVE  Modalities Rationale:     decrease inflammation and decrease pain to improve patient's ability to safely perform ADLs, squatting, lifting, climbing; perform prolong sitting/standing/ambulation; and negotiate stairs with no pain or limitations     10 min []  Ice     []  Heat    location/position:    [x]Skin assessment post-treatment (if applicable):   [x]  intact    []  redness- no adverse reaction                  []redness  adverse reaction:      35 min Therapeutic Exercise:  [x]  See flow sheet   Rationale:      increase ROM, increase strength, improve coordination and improve balance to improve the patients ability to  safely perform ADLs, squatting, lifting, climbing; perform prolong sitting/standing/ambulation; and negotiate stairs with no pain or limitations       10 min Therapeutic Activity: [x]  See flow sheet   Rationale:    increase ROM, increase strength and improve coordination to improve the patients ability to  safely perform ADLs, squatting, lifting, climbing; perform prolong sitting/standing/ambulation; and negotiate stairs with no pain or limitations     10 min Neuromuscular Re-ed: [x]  See flow sheet   Rationale:    increase ROM, increase strength, improve coordination, improve balance and increase proprioception to improve the patients ability to  safely perform ADLs, squatting, lifting, climbing; perform prolong sitting/standing/ambulation; and negotiate stairs with no pain or limitations       Billed With/As:   [x] TE   [x] TA   [x] Neuro   [] Self Care Patient Education: [x] Review HEP    [] Progressed/Changed HEP based on:   [x] positioning   [x] body mechanics   [x] transfers   [] heat/ice application    [x] other: Pt ed on importance and benefits of compliance with HEP, core strength/stability and proper posture; pt verbalized understanding       Other Objective/Functional Measures:    VCs + demo to perform proper technique for TE  added OTB to clams and bridges with  no c/o pain noted at this time  demos good bridge form  attempted wall squats for 10'' pt with c/o pain, reduced to 5'' and pain managed     Post Treatment Pain Level (on 0 to 10) scale:   0  / 10     ASSESSMENT  Assessment/Changes in Function:   demos SLR without Ext lag, progressing towards quad strength     []  See Progress Note/Recertification   Patient will continue to benefit from skilled PT services to modify and progress therapeutic interventions, address functional mobility deficits, address ROM deficits, address strength deficits, analyze and address soft tissue restrictions, analyze and cue movement patterns, analyze and modify body mechanics/ergonomics, assess and modify postural abnormalities and instruct in home and community integration to attain remaining goals.    Progress toward goals / Updated goals:  See PN last visit, no changes noted at this time       PLAN  [x]  Upgrade activities as tolerated yes Continue plan of care   []  Discharge due to :    []  Other:      Therapist: Devante Padilla PTA    Date: 12/29/2021 Time: 11:42 AM     Future Appointments   Date Time Provider Elizabeth Gage   12/29/2021 11:45 AM Jazmín Urena PTA [facility] 1/4/2022 11:00 AM [facility]   1/6/2022 11:45 AM [facility]   1/11/2022  2:45 PM [facility]   1/13/2022  9:30 AM Maggie Burkett PT [facility]   1/18/2022 11:00 AM Maggie Burkett PT [facility]   1/20/2022 11:00 AM Sugey Bhatti [facility]   1/25/2022 11:00 AM [facility]

## 2022-01-04 ENCOUNTER — APPOINTMENT (OUTPATIENT)
Dept: PHYSICAL THERAPY | Age: 32
End: 2022-01-04
Payer: MEDICAID

## 2022-01-06 ENCOUNTER — APPOINTMENT (OUTPATIENT)
Dept: PHYSICAL THERAPY | Age: 32
End: 2022-01-06
Payer: MEDICAID

## 2022-01-11 ENCOUNTER — HOSPITAL ENCOUNTER (OUTPATIENT)
Dept: PHYSICAL THERAPY | Age: 32
Discharge: HOME | End: 2022-01-11
Payer: MEDICAID

## 2022-01-11 PROCEDURE — 97110 THERAPEUTIC EXERCISES: CPT

## 2022-01-11 PROCEDURE — 97116 GAIT TRAINING THERAPY: CPT

## 2022-01-11 PROCEDURE — 97112 NEUROMUSCULAR REEDUCATION: CPT

## 2022-01-11 NOTE — PROGRESS NOTES
PHYSICAL THERAPY - DAILY TREATMENT NOTE    Patient Name: Kayla Scanlon        Date: 2022  : 1990   YES Patient  Verified  Visit #:     Insurance: Payor: BLUE CROSS MEDICAID / Plan: [employer] PLUS / Product Type: Managed Care Medicaid /      In time: 2:45 Out time: 3:33   Total Treatment Time: 48     Medicare/BCBS [place] Time Tracking (below)   Total Timed Codes (min):  na 1:1 Treatment Time:  na     TREATMENT AREA =  Right knee pain [M25.561]    SUBJECTIVE    Pain Level (on 0 to 10 scale):  1  / 10   Medication Changes/New allergies or changes in medical history, any new surgeries or procedures? NO    If yes, update Summary List   Subjective Functional Status/Changes:  []  No changes reported     Pt states she has mixed feeling about getting the surgery, she doesn't want to have to be on crutches again because she has to rely on her parents, but knows if she wants to get back to climbing she needs her ACL. OBJECTIVE    26 min Therapeutic Exercise:  [x]  See flow sheet   Rationale:    increase ROM, increase strength, improve coordination, improve balance and increase proprioception to promote increased functional mobility and increased activity tolerance with ADL's. 10 min Neuromuscular Re-ed: Static standing balance exercises with EO/EC   Rationale:     improve coordination, improve balance and increase proprioception to improve the patients ability to perform ADLs, transfers and gait safely and independently. 12 min Gait Training: high knee, retro and side stepping gait    Rationale:   improve coordination, improve balance, increase proprioception and improve gait pattern to increase safety and [place] with amb to promote increased functional mobility.         min Patient Education:  YES  Reviewed HEP   []  Progressed/Changed HEP based on:   Advised pt to assess response to progression of therapeutic exercise this session, pt verbalized understanding. Other Objective/Functional Measures:    Progressed HR to off step. Added high knee, retro and side stepping gait, pt unsteady, but no LOB noted. Added mini squats (<90 degrees). Initiated static standing balance   EO SR 30\" B   EC MSR instep 30\" B      Post Treatment Pain Level (on 0 to 10) scale:   1  / 10     ASSESSMENT    Assessment/Changes in Function:     Pt with good tolerance to progression of therapeutic activity. []  See Progress Note/Recertification   Patient will continue to benefit from skilled PT services to modify and progress therapeutic interventions, address functional mobility deficits, address ROM deficits, address strength deficits, analyze and address soft tissue restrictions, analyze and cue movement patterns, assess and modify postural abnormalities, and instruct in home and community integration to attain remaining goals. Progress toward goals / Updated goals:    1. Patient will increase FOTO Functional Status score to 60/100 to decrease functional limitations. 2.  Patient will increase strength with right knee extension to 4/5 degrees to improve dynamic stability with gait and functional activities. 3.  Patient will demonstrate gait without brace or assistive device with normal mechanics to return to PLOF and allow her to perform ADL's/IADL's with increased safety/independence and decreased pain. dynamic gait activity without brace    4.   Patient will be independent with home exercise program in preparation for discharge.           PLAN    []  Upgrade activities as tolerated YES Continue plan of care   []  Discharge due to :    []  Other:      Therapist: Jony Sorensen PTA    Date: 1/11/2022 Time: 3:16 PM     Future Appointments   Date Time Provider Elizabeth Gage   1/13/2022  9:30 AM Cristela Gonzales PT [facility]   1/18/2022 11:00 AM Cristela Gonzales PT [facility]   1/20/2022 11:00 AM Josi Rios [facility]   1/25/2022 11:00 AM Keila Valle [facility]

## 2022-01-13 ENCOUNTER — HOSPITAL ENCOUNTER (OUTPATIENT)
Dept: PHYSICAL THERAPY | Age: 32
Discharge: HOME | End: 2022-01-13
Payer: MEDICAID

## 2022-01-13 PROCEDURE — 97116 GAIT TRAINING THERAPY: CPT

## 2022-01-13 PROCEDURE — 97110 THERAPEUTIC EXERCISES: CPT

## 2022-01-13 PROCEDURE — 97530 THERAPEUTIC ACTIVITIES: CPT

## 2022-01-13 NOTE — PROGRESS NOTES
PHYSICAL THERAPY - DAILY TREATMENT NOTE    Patient Name: Melanie Yang        Date: 2022  : 1990   YES Patient  Verified  Visit #:   10   of   12-24  Insurance: Payor: BLUE CROSS MEDICAID / Plan: [address] [place] [address] / Product Type: Managed Care Medicaid /      In time: 9:30 Out time: 10:13   Total Treatment Time: 43     Medicare/BCBS [place] Time Tracking (below)   Total Timed Codes (min):  na 1:1 Treatment Time:  na     TREATMENT AREA =  Right knee pain [M25.561]    SUBJECTIVE    Pain Level (on 0 to 10 scale):  1  / 10   Medication Changes/New allergies or changes in medical history, any new surgeries or procedures? NO    If yes, update Summary List   Subjective Functional Status/Changes:  []  No changes reported     Pt reports right knee was sore for a couple of days after new exercises last PT session. OBJECTIVE    20 min Therapeutic Exercise:  [x]  See flow sheet   Rationale:    increase ROM, increase strength, improve coordination, improve balance and increase proprioception to promote increased functional mobility and increased activity tolerance with ADL's.    13 min Therapeutic Activity: see flow sheet    Rationale:    increase ROM, increase strength, improve coordination, improve balance and increase proprioception to promote increased functional mobility and increased activity tolerance with ADL's. 10 min Gait Training: see flow sheet    Rationale:   improve coordination, improve balance, increase proprioception and improve gait pattern to increase safety and [place] with amb to promote increased functional mobility. min Patient Education:  YES  Reviewed HEP   []  Progressed/Changed HEP based on:   Advised pt okay to initiate mini squats at home, instructed pt in 1-2 sets of 10 per day and to make sure to avoid >90 degrees, pt verbalized understanding.       Other Objective/Functional Measures:    Did not progress therapeutic exercise this session secondary to reports of DOMS after ex progression last session. Pt c/o mild medial knee pain after mini squat. Pt demo's good form and understanding of knee angle <90 deg. Mod VCing for form with newer therapeutic exercise. Post Treatment Pain Level (on 0 to 10) scale:   1  / 10     ASSESSMENT    Assessment/Changes in Function:     Pt with reports of DOMS with progression of therapeutic exercise last session. []  See Progress Note/Recertification   Patient will continue to benefit from skilled PT services to modify and progress therapeutic interventions, address functional mobility deficits, address ROM deficits, address strength deficits, analyze and address soft tissue restrictions, analyze and cue movement patterns, assess and modify postural abnormalities, and instruct in home and community integration to attain remaining goals. Progress toward goals / Updated goals:    New Goals to be achieved in __4__  weeks:  1. Patient will increase FOTO Functional Status score to 60/100 to decrease functional limitations. 2.  Patient will increase strength with right knee extension to 4/5 degrees to improve dynamic stability with gait and functional activities. mini squats for strength   3. Patient will demonstrate gait without brace or assistive device with normal mechanics to return to PLOF and allow her to perform ADL's/IADL's with increased safety/independence and decreased pain. 4.  Patient will be independent with home exercise program in preparation for discharge.         PLAN    []  Upgrade activities as tolerated YES Continue plan of care   []  Discharge due to :    []  Other:      Therapist: Alireza Durán PTA    Date: 1/13/2022 Time: 9:34 AM     Future Appointments   Date Time Provider Elizabeth Gage   1/18/2022 11:00 AM Isabela Javier PT [facility]   1/20/2022 11:00 AM Flaquita Chen [facility]   1/25/2022 11:00 AM Flaquita MCCORMICK [facility]

## 2022-01-18 ENCOUNTER — HOSPITAL ENCOUNTER (OUTPATIENT)
Dept: PHYSICAL THERAPY | Age: 32
Discharge: HOME | End: 2022-01-18
Payer: MEDICAID

## 2022-01-18 PROCEDURE — 97110 THERAPEUTIC EXERCISES: CPT

## 2022-01-18 PROCEDURE — 97112 NEUROMUSCULAR REEDUCATION: CPT

## 2022-01-18 PROCEDURE — 97530 THERAPEUTIC ACTIVITIES: CPT

## 2022-01-18 NOTE — PROGRESS NOTES
PHYSICAL THERAPY - DAILY TREATMENT NOTE    Patient Name: Rosie Acevedo        Date: 2022  : 1990   YES Patient  Verified  Visit #:     Insurance: Payor: BLUE CROSS MEDICAID / Plan: [employer] [place] / Product Type: Managed Care Medicaid /      In time: 11:00 Out time: 11:44   Total Treatment Time: 44     Medicare/BCBS [place] Time Tracking (below)   Total Timed Codes (min):  44 1:1 Treatment Time:  44     TREATMENT AREA = Right knee pain [M25.561]    SUBJECTIVE    Pain Level (on 0 to 10 scale):  1  / 10   Medication Changes/New allergies or changes in medical history, any new surgeries or procedures?    no    If yes, update Summary List   Subjective Functional Status/Changes:  []  No changes reported     Reports right knee is doing OK. Reports right ACL surgery still scheduled for 2022. OBJECTIVE    15 min Therapeutic Exercise:  [x]  See flow sheet   Rationale:      increase ROM, increase strength, improve coordination, and increase proprioception to improve the patients ability to perform ADL's, gait, and functional mobility with decreased pain and improved joint mechanics. 15 min Therapeutic Activity: See flowsheet   Rationale:      increase ROM, increase strength, improve coordination, and increase proprioception to improve the patients ability to perform ADL's, gait, and functional mobility with decreased pain and improved joint mechanics. 14 min Neuromuscular Re-ed: See flowsheet   Rationale:      increase ROM, increase strength, improve coordination, improve balance, and increase proprioception to improve the patients ability to perform ADL's, gait, and functional mobility with decreased pain and improved joint mechanics. min Patient Education:  YES  Reviewed HEP   []  Progressed/Changed HEP based on:   Cont HEP     Other Objective/Functional Measures: Added step ups (forward and lateral) and reverse lunge.   Progressed to unilateral bridge  Increased resistance with SLR x 4    Patient ambulates in and around clinic without assistive device and with no significant gait abnormalities. Post Treatment Pain Level (on 0 to 10) scale:   0  / 10     ASSESSMENT    Assessment/Changes in Function:     Noted right quad fatigue with eccentric stair descent. Patient unable to perform reverse lunge without UE assist due to right LE weakness. []  See Progress Note/Recertification   Patient will continue to benefit from skilled PT services to modify and progress therapeutic interventions, address functional mobility deficits, address ROM deficits, address strength deficits, analyze and address soft tissue restrictions, analyze and cue movement patterns, analyze and modify body mechanics/ergonomics, assess and modify postural abnormalities and instruct in home and community integration to attain remaining goals. Progress toward goals / Updated goals:    New Goals to be achieved in __4__  weeks:  1.  Patient will increase FOTO Functional Status score to 60/100 to decrease functional limitations. 2.  Patient will increase strength with right knee extension to 4/5 degrees to improve dynamic stability with gait and functional activities. added LAQ   3.  Patient will demonstrate gait without brace or assistive device with normal mechanics to return to PLOF and allow her to perform ADL's/IADL's with increased safety/independence and decreased pain. 4.  Patient will be independent with home exercise program in preparation for discharge.   Compliant with HEP              PLAN    [x]  Upgrade activities as tolerated YES Continue plan of care   []  Discharge due to :    []  Other:      Therapist: Colleen Sharpe, PT    Date: 1/18/2022 Time: 11:09 AM     Future Appointments   Date Time Provider Elizabeth Gage   1/20/2022 11:00 AM Janae Najjar [facility]   1/25/2022 11:00 AM [id number]

## 2022-01-20 ENCOUNTER — HOSPITAL ENCOUNTER (OUTPATIENT)
Dept: PHYSICAL THERAPY | Age: 32
Discharge: HOME | End: 2022-01-20
Payer: MEDICAID

## 2022-01-20 PROCEDURE — 97110 THERAPEUTIC EXERCISES: CPT

## 2022-01-20 PROCEDURE — 97530 THERAPEUTIC ACTIVITIES: CPT

## 2022-01-20 PROCEDURE — 97112 NEUROMUSCULAR REEDUCATION: CPT

## 2022-01-20 NOTE — PROGRESS NOTES
PHYSICAL THERAPY - DAILY TREATMENT NOTE    Patient Name: Vazquez Jaime        Date: 2022  : 1990   YES Patient  Verified  Visit #:     Insurance: Payor: BLUE CROSS MEDICAID / Plan: [facility] [employer] PLUS / Product Type: Managed Care Medicaid /      In time: 11:01 Out time: 11:44   Total Treatment Time: 43     Medicare/BCBS [place] Time Tracking (below)   Total Timed Codes (min):  na 1:1 Treatment Time:  na     TREATMENT AREA =  Right knee pain [M25.561]    SUBJECTIVE    Pain Level (on 0 to 10 scale):  1  / 10   Medication Changes/New allergies or changes in medical history, any new surgeries or procedures? NO    If yes, update Summary List   Subjective Functional Status/Changes:  []  No changes reported     Pt reports she is not nervous about having knee surgery again, more about the recovery. Pt states she did fine after last PT session, no pain or soreness. \"It's (leg) weak I couldn't do them (lunges) last time. \"       OBJECTIVE    18 min Therapeutic Exercise:  [x]  See flow sheet   Rationale:    increase ROM, increase strength, improve coordination, improve balance and increase proprioception to promote increased functional mobility and increased activity tolerance with ADL's. 10 min Therapeutic Activity: see flow sheet    Rationale:    increase ROM, increase strength, improve coordination, improve balance and increase proprioception to promote increased functional mobility and increased activity tolerance with ADL's. 15 min Neuromuscular Re-ed: see flow sheet    Rationale:     improve coordination, improve balance and increase proprioception to improve the patients ability to perform ADLs, transfers and gait safely and independently.         min Patient Education:  YES  Reviewed HEP   []  Progressed/Changed HEP based on:   Patient with no questions, continue same HEP     Other Objective/Functional Measures:    Progressed static standing as able:   EO SR 30\" B   EO MSR bun 30\" B     Added lateral monster walk for quad strengthening. Pt reports increased ease going laterally to left>right. Added partial lunge in parallel bars, mod VCing for form. Pt reports mild right knee pain, instructed pt to work in pain free ROM. Pt reports quad fatigue with recumbent bike. Post Treatment Pain Level (on 0 to 10) scale:   1  / 10     ASSESSMENT    Assessment/Changes in Function:     Pt with improved form with mini lunges in parallel bars compared to TRX reverse lunge. []  See Progress Note/Recertification   Patient will continue to benefit from skilled PT services to modify and progress therapeutic interventions, address functional mobility deficits, address ROM deficits, address strength deficits, analyze and address soft tissue restrictions, analyze and cue movement patterns, assess and modify postural abnormalities, and instruct in home and community integration to attain remaining goals. Progress toward goals / Updated goals:    1. Patient will increase FOTO Functional Status score to 60/100 to decrease functional limitations. 2.  Patient will increase strength with right knee extension to 4/5 degrees to improve dynamic stability with gait and functional activities. 3.  Patient will demonstrate gait without brace or assistive device with normal mechanics to return to PLOF and allow her to perform ADL's/IADL's with increased safety/independence and decreased pain. lateral gait activity for stabilization    4.   Patient will be independent with home exercise program in preparation for discharge.           PLAN    []  Upgrade activities as tolerated YES Continue plan of care   []  Discharge due to :    []  Other:      Therapist: Jony Sorensen PTA    Date: 1/20/2022 Time: 11:13 AM     Future Appointments   Date Time Provider Elizabeth Gage   1/25/2022 11:00 AM Josi Rios [facility]

## 2022-01-25 ENCOUNTER — HOSPITAL ENCOUNTER (OUTPATIENT)
Dept: PHYSICAL THERAPY | Age: 32
Discharge: HOME | End: 2022-01-25
Payer: MEDICAID

## 2022-01-25 PROCEDURE — 97112 NEUROMUSCULAR REEDUCATION: CPT

## 2022-01-25 PROCEDURE — 97530 THERAPEUTIC ACTIVITIES: CPT

## 2022-01-25 PROCEDURE — 97110 THERAPEUTIC EXERCISES: CPT

## 2022-01-25 NOTE — PROGRESS NOTES
PHYSICAL THERAPY - DAILY TREATMENT NOTE    Patient Name: Juan Miguel Vega        Date: 2022  : 1990   YES Patient  Verified  Visit #:     Insurance: Payor: [employer]OSS MEDICAID / Plan: [facility] [employer] / Product Type: Managed Care Medicaid /      In time: 11:01 Out time: 11:47   Total Treatment Time: 46     Medicare/BCBS [place] Time Tracking (below)   Total Timed Codes (min):  na 1:1 Treatment Time:  na     TREATMENT AREA =  Right knee pain [M25.561]    SUBJECTIVE    Pain Level (on 0 to 10 scale):  1  / 10   Medication Changes/New allergies or changes in medical history, any new surgeries or procedures? NO    If yes, update Summary List   Subjective Functional Status/Changes:  []  No changes reported     Pt reports having surgery on the . Pt reports her right knee has been feeling good. OBJECTIVE    16 min Therapeutic Exercise:  [x]  See flow sheet   Rationale:    increase ROM, increase strength, improve coordination, improve balance and increase proprioception to promote increased functional mobility and increased activity tolerance with ADL's. 20 min Therapeutic Activity: see flow sheet    Rationale:    increase ROM, increase strength, improve coordination, improve balance and increase proprioception to promote increased functional mobility and increased activity tolerance with ADL's. 10 min Neuromuscular Re-ed: see flow sheet    Rationale:     improve coordination, improve balance and increase proprioception to improve the patients ability to perform ADLs, transfers and gait safely and independently. min Patient Education:  YES  Reviewed HEP   []  Progressed/Changed HEP based on:   Pt advised okay to continue HEP until surgery, then follow protocol. Discussed managing pain and edema s/p surgery.        Other Objective/Functional Measures:    Right knee AROM: 0 - 140 deg                                                                          Strength (0-5)  Hip Left Right   Flexion 5 4+   Extension 5 4+   Abduction 5 5   Knee Left Right   Extension 5 4-/5   Flexion 5 4     FOTO:64/100       Post Treatment Pain Level (on 0 to 10) scale:   1  / 10     ASSESSMENT    Assessment/Changes in Function:     Pt's FOTO score improved 35  points since initial evaluation indicating increased activity tolerance and correlates to a 36% functional limitation. [x]  See Progress Note/Recertification   Patient will continue to benefit from skilled PT services to NA-See DC Summary     Progress toward goals / Updated goals:    1. Patient will increase FOTO Functional Status score to 60/100 to decrease functional limitations. MET   2. Patient will increase strength with right knee extension to 4/5 degrees to improve dynamic stability with gait and functional activities. Progressing=4-/5   3. Patient will demonstrate gait without brace or assistive device with normal mechanics to return to PLOF and allow her to perform ADL's/IADL's with increased safety/independence and decreased pain. MET   4. Patient will be independent with home exercise program in preparation for discharge. MET        PLAN    []  Upgrade activities as tolerated NA Continue plan of care   [x]  Discharge due to : pt scheduled for right ACL surgery   []  Other:      Therapist: Thomas Carreon PTA    Date: 1/25/2022 Time: 11:04 AM     No future appointments.

## 2022-01-25 NOTE — PROGRESS NOTES
[facility] PHYSICAL THERAPY  [address], [place], [address] - Phone: [phone]  Fax: [phone] SUMMARY  Patient Name: Savanah Palomino : 1990   Treatment/Medical Diagnosis: Right knee pain [M25.561]   Referral Source: Carmen Fonseca DO     Date of Initial Visit: 2021 Attended Visits: 13 Missed Visits: 1     SUMMARY OF TREATMENT  Patient's treatment consisted of gait and balance training, right LE ROM and strengthening exercises, pt education and instruction in home exercise program.     CURRENT STATUS  Pt has progressed well per protocol and tolerated therapeutic exercise without increased pain. Pt is currently ambulating with good heel to toe gait mechanics without an assistive device. Pt reports average 1/10 pain with ADL's, but continues with quad weakness with squatting activity. Patients Functional Status Summary score improved 35  points since initial evaluation indicating increased activity tolerance and correlates to a 36% functional limitation. Pt is progressing with right knee ROM and strength:     Right knee AROM: 0 - 140 deg                                                                      Strength (0-5)  Hip Left Right   Flexion 5 4+   Extension 5 4+   Abduction 5 5   Knee Left Right   Extension 5 4-/5   Flexion 5 4      FOTO:64/100    Goal/Measure of Progress Goal Met? 1. Patient will increase FOTO Functional Status score to 60/100 to decrease functional limitations. Status at last Eval:  Current Status: 64/100 yes   2. Patient will increase strength with right knee extension to 4/5 degrees to improve dynamic stability with gait and functional activities. Status at last Eval: 3+/5 Current Status: 4-/5 no   3.   Patient will demonstrate gait without brace or assistive device with normal mechanics to return to [facility] and allow her to perform ADL's/IADL's with increased safety/independence and decreased pain. Status at last Eval: unable Current Status: good gait mechanics  yes   4. Patient will be independent with home exercise program in preparation for discharge. Status at last Eval: HEP issued Current Status: Pt I with HEP yes     RECOMMENDATIONS  Other: Patient is scheduled for right ACL reconstruction    If you have any questions/comments please contact us directly at [phone]. Thank you for allowing us to assist in the care of your patient. LPTA Signature: Arslan Cunningham PTA Date: 01/25/2022   Therapist Signature: Michaela Hawkins MPT Time: 3:07 PM     Physician Signature:        Date:       Time:                                          Han Ludwig DO    To ensure we are able to process the patients encounter and avoid risk of your patient receiving a bill for our services, please sign and return this discharge summary by 02/24/2022.  (30 days following DC date)

## 2022-01-27 ENCOUNTER — APPOINTMENT (OUTPATIENT)
Dept: PHYSICAL THERAPY | Age: 32
End: 2022-01-27
Payer: MEDICAID

## 2022-02-16 ENCOUNTER — APPOINTMENT (OUTPATIENT)
Dept: PHYSICAL THERAPY | Age: 32
End: 2022-02-16
Payer: MEDICAID

## 2022-02-17 ENCOUNTER — HOSPITAL ENCOUNTER (OUTPATIENT)
Dept: PHYSICAL THERAPY | Age: 32
Discharge: HOME | End: 2022-02-17
Payer: MEDICAID

## 2022-02-17 PROCEDURE — 97161 PT EVAL LOW COMPLEX 20 MIN: CPT

## 2022-02-17 NOTE — PROGRESS NOTES
[facility] PHYSICAL THERAPY  [address] [facility] [place], [address] - Phone: [phone]  Fax: [phone] / [address]  Patient Name: Breanne Stevenson : 1990   Medical   Diagnosis: Right knee pain [M25.561]  Postoperative pain of right knee [G89.18, M25.561] Treatment Diagnosis: S/P Right ACL Reconstruction, Chondroplasty   Onset Date: DOI 2021  DOS 22     Referral Source: Lucila Hutchison DO Start of Care [facility]): 2022   Prior Hospitalization: See medical history Provider #: [id number]   Prior Level of Function: Recreationally active   Comorbidities: Hx ACL reconstruction, medial meniscus repair, microfx of femoraly condyle. Medications: Verified on Patient Summary List   The Plan of Care and following information is based on the information from the initial evaluation.   ===========================================================================================  Assessment / key information: Patient is a 33 y.o. female presenting to clinic 2 weeks s/p ACL reconstruction from initial injury sustained 2021. She had prior surgery 2021 for medial meniscus repair, partial lateral meniscectomy, and microfracture. She currently presents with B axillary crutches and immobilizer and the following objective measures:                                                        AROM                    PROM                            Left Right Left Right   Knee Flexion 140 118 141 129     Extension +5 -10 +5 -8   Pt  will benefit from physical therapist management to address her impairments (listed below),  educate her, and improve her level of function.  Thanks for your referral.   ===========================================================================================  Eval Complexity: History: HIGH Complexity :3+ comorbidities / personal factors will impact the outcome/ POC Exam:LOW Complexity : 1-2 Standardized tests and measures addressing body structure, function, activity limitation and / or participation in recreation  Presentation: LOW Complexity : Stable, uncomplicated  Clinical Decision Making:MEDIUM Complexity : FOTO score of 26-74Overall Complexity:LOW   Problem List: pain affecting function, decrease ROM, decrease strength, edema affecting function, impaired gait/ balance, decrease ADL/ functional abilitiies, decrease activity tolerance, decrease flexibility/ joint mobility and decrease transfer abilities  FOTO score: 36 indicating 64% functional disability  Treatment Plan may include any combination of the following: Therapeutic exercise, Therapeutic activities, Neuromuscular re-education, Physical agent/modality, Gait/balance training, Manual therapy, Aquatic therapy, Patient education, Self Care training, Functional mobility training, Home safety training and Stair training  Patient / Family readiness to learn indicated by: asking questions, trying to perform skills and interest  Persons(s) to be included in education: patient (P)  Barriers to Learning/Limitations: None  Measures taken: n/a   Patient Goal (s): \"Walk normally\"   Patient self reported health status: good  Rehabilitation Potential: good   Short Term Goals: To be accomplished in  4  weeks:  1. Patient to be adherent to HEP to facilitate pain control with ADL's.  2. Patient to be compliant towards precautions to ensure proper healing to transition to next phase of protocol. 3. Patient to demonstrate right knee PROM extension to 0 degrees   4. Patient to be able to perform SLR on right LE without extensor lag indicating improved quad control in preparation for return to a normalized gait.  Long Term Goals: To be accomplished in  8  weeks:  1. Patient to be Safe and Independent with HEP to self-manage/prevent symptoms after DC.   2. Patient to increase FS FOTO score to > 57 to indicate increased functional independence. 3. Patient to demonstrate normalized gait without AD. Frequency / Duration:   Patient to be seen  2-3  times per week for 8  weeks:  Patient / Caregiver education and instruction: activity modification and exercises. We reviewed our facility's Patient Personal Responsibilities (PPR) form, particularly in regards to compliance towards her appointment time, our attendance policy, and her home exercise program. Patient was informed of possible discharge for non-compliance to our attendance policy per PPR form. We also discussed her POC as deemed appropriate by the treating therapist and physician. Patient verbalized understanding that she must show objective and functional improvement in an appropriate time frame. Patient verbalized understanding that should progress or compliance be lacking, we will contact the referring physician for further consultation to address and attempt to establish alternate treatment strategies as necessary and/or possibly discharge. Therapist Signature: Latanya Yeboah \"BJ\" Larry Gee, DPT, Cert. MDT, Cert. DN, Cert. SMT, Dip. Osteopractic Date: 2/38/2985   Certification Period: n/a Time: 12:00 PM   ===========================================================================================  I certify that the above Physical Therapy Services are being furnished while the patient is under my care. I agree with the treatment plan and certify that this therapy is necessary. Physician Signature:        Date:       Time:                                         Babetta Dance, DO   Please sign and return to [facility] or you may fax the signed copy to [phone]. Thank you.

## 2022-02-17 NOTE — PROGRESS NOTES
PHYSICAL THERAPY - DAILY TREATMENT NOTE  Patient Name: Cheryle Port        Date: 2022  : 1990   [x]  Patient  Verified  Visit #:      16  Insurance: Payor: BLUE CROSS MEDICAID / Plan: [facility] HEALTHKEEPERS PLUS / Product Type: Managed Care Medicaid /      In time:   11:30          Out time:   11:55 Total Treatment Time (min):   25   Medicare Time Tracking (below)   Total Timed Codes (min):  n/a 1:1 Treatment Time:  n/a     TREATMENT AREA = Right knee pain [M25.561]  Postoperative pain of right knee [G89.18, M25.561]    SUBJECTIVE    Pain Level (on 0 to 10 scale):  0  / 10   Medication Changes/New allergies or changes in medical history, any new surgeries or procedures? []  No    []  Yes   If yes, update Summary List:    Subjective Functional Status/Changes:  []  No changes reported     HISTORY    Present Symptoms: right knee stiffness    Present since:  22  Commenced as a result of:    or []  No apparent reason   or Surgery- ACL allograft, chondroplasty, removal of hardware after failed conservative therapy from initial injury 21    Constant symptoms: Stiffness, swelling    Intermittent symptoms: sharp pain    What produces or worsens: attaining end range. What stops or reduces: rest    Continued use makes the pain:  [] Better []  Worse []  No effect     Disturbed night: [] No    [x] Yes    Pain at rest: [] No    [x] Yes       Treatments this episode: knee immobilizer    Previous episodes: prior hx of PT      Spinal history:unremarkable    Paraesthesia: [] No    [x] Yes - below knee. Effect of medications (if appropriate)-    General Health:  [x] Good []  Fair []  Poor     Imaging:   [] Yes [x]  No       Work:  Mechanical Stresses: currently not working, works at [address]: Mechanical Stresses: rock climbing    Functional disability from present episode: NWB.     Summary:    [] Acute []  Sub-acute []  Chronic     [x] Trauma/Surgery []  Insidious onset OBJECTIVE    Gait:  [] Normal    [] Abnormal    [] Antalgic    [x] NWB    Device:B axillary crutches     ROM / Strength        AROM          PROM                      Strength (1-5)    Left Right Left Right Left Right   Knee Flexion 140 118 141 129      Extension +5 -10 +5 -8       Flexibility: [] Unable to assess at this time  Hamstrings:    (L) Tightness= [x] WNL   [] Min   [] Mod   [] Severe    (R) Tightness= [x] WNL   [] Min   [] Mod   [] Severe  Quadriceps:    (L) Tightness= [x] WNL   [] Min   [] Mod   [] Severe    (R) Tightness= [] WNL   [] Min   [x] Mod   [] Severe  Gastroc:      (L) Tightness= [] WNL   [] Min   [] Mod   [] Severe    (R) Tightness= [] WNL   [] Min   [x] Mod   [] Severe  Other:    Palpation:   Neg/Pos  Neg/Pos  Neg/Pos   Joint Line + Quad tendon  Patellar ligament    Patella  Fibular head  Pes Anserinus    Tibial tubercle  Hamstring tendons  Infrapatellar fat pad        Post Treatment Pain Level (on 0 to 10) scale:   0  / 10     ASSESSMENT    Assessment/Changes in Function:   Justification for Eval Code Complexity:  Patient History : high  Examination low  Clinical Presentation: low  Clinical Decision Making : FOTO mod   []  See Progress Note/Recertification   Patient will continue to benefit from skilled PT services to  modify and progress therapeutic interventions, address functional mobility deficits, address ROM deficits, address strength deficits, analyze and address soft tissue restrictions, analyze and cue movement patterns, analyze and modify body mechanics/ergonomics, assess and modify postural abnormalities and instruct in home and community integration to attain remaining goals       [x]  Upgrade activities as tolerated []  Continue plan of care   []  Discharge due to :    [x]  Other: Initiate POC     Therapist: Miladis Bermeo \"BJ\" JAVON Herrera, Cert. MDT, Cert. DN, Cert. SMT, Dip.  Osteopractic Date: 2/17/2022     Time: 11:25 AM

## 2022-02-22 ENCOUNTER — HOSPITAL ENCOUNTER (OUTPATIENT)
Dept: PHYSICAL THERAPY | Age: 32
Discharge: HOME | End: 2022-02-22
Payer: MEDICAID

## 2022-02-22 PROCEDURE — 97112 NEUROMUSCULAR REEDUCATION: CPT

## 2022-02-22 PROCEDURE — 97116 GAIT TRAINING THERAPY: CPT

## 2022-02-22 PROCEDURE — 97110 THERAPEUTIC EXERCISES: CPT

## 2022-02-22 NOTE — PROGRESS NOTES
PHYSICAL THERAPY - DAILY TREATMENT NOTE    Patient Name: Laya Todd        Date: 2022  : 1990   YES Patient  Verified  Visit #:   2     Insurance: Payor: BLUE CROSS MEDICAID / Plan: [place] / Product Type: Managed Care Medicaid /      In time: 11:04 Out time: 11:43   Total Treatment Time: 39     Medicare/BCBS [place] Time Tracking (below)   Total Timed Codes (min):  39 1:1 Treatment Time:  39     TREATMENT AREA =  Right knee pain [M25.561]  Postoperative pain of right knee [G89.18, M25.561]    SUBJECTIVE    Pain Level (on 0 to 10 scale):  0  / 10   Medication Changes/New allergies or changes in medical history, any new surgeries or procedures? NO    If yes, update Summary List   Subjective Functional Status/Changes:  []  No changes reported     Pt states she has been really working on managing the swelling by icing and elevating her leg, reports she has been up and walking a lot. \"Do I need to still walk with crutches? \"       OBJECTIVE  Therapeutic Procedures:  Min Procedure Specifics + Rationale   19 [x] Therapeutic Exercise   [x]  See Flowsheet   Rationale: increase ROM and increase strength to improve the patients ability to participate in ADL's    10 [x] Neuromuscular Re-ed   [x]  See Flowsheet    Rationale: increase ROM, increase strength, improve coordination, improve balance and increase proprioception  to improve the patients ability to safely participate in ADL's   10 [x]  Gait Training 60' and 30' X 2  Rationale: Normalize gait, increase proprioceptive and kinesthetic awareness, coordination, balance      min Patient Education:  YES  Reviewed HEP   []  Progressed/Changed HEP based on:   Patient with no questions, continue same HEP.    Educated pt on need to protect knee during revascularization of allograft     Other Objective/Functional Measures:    Reviewed heel to toe gait with B axillary crutches and knee brace locked, advised okay to unlock brace at 4 weeks. Pt c/o soreness right HS with prone curls. Pt able to don/doff knee brace I. Post Treatment Pain Level (on 0 to 10) scale:   0-1  / 10     ASSESSMENT    Assessment/Changes in Function:     Pt demo's good heel to toe gait pattern during gait with B axillary crutches and knee brace. []  See Progress Note/Recertification   Patient will continue to benefit from skilled PT services to modify and progress therapeutic interventions, address functional mobility deficits, address ROM deficits, address strength deficits, analyze and address soft tissue restrictions, analyze and cue movement patterns, assess and modify postural abnormalities, and instruct in home and community integration to attain remaining goals. Progress toward goals / Updated goals:    1. Patient to be adherent to HEP to facilitate pain control with ADL's.  2. Patient to be compliant towards precautions to ensure proper healing to transition to next phase of protocol. reviewed knee precautions for first 4-6 weeks   3. Patient to demonstrate right knee PROM extension to 0 degrees   4. Patient to be able to perform SLR on right LE without extensor lag indicating improved quad control in preparation for return to a normalized gait.      PLAN    []  Upgrade activities as tolerated YES Continue plan of care   []  Discharge due to :    []  Other:      Therapist: Madeline England, PARESH    Date: 2/22/2022 Time: 1:24 PM     Future Appointments   Date Time Provider Elizabeth Gage   2/23/2022 11:45 AM Effie Gibbs [facility]   3/1/2022 11:00 AM Kim Julian PT [facility]   3/3/2022 11:00 AM Kim Julian PT [facility]   3/8/2022 11:00 AM Ty Talavera PT [facility]   3/10/2022 11:00 AM Ty Talavera, PT [facility]   3/15/2022 11:00 AM Kim Julian PT [facility]   3/17/2022 11:00 AM Kim Julian PT [facility]   3/22/2022 11:00 AM Kim Julian PT [facility]   3/24/2022 11:00 AM Ty Talavera PT [facility]   3/29/2022 11:00 AM Krish, [address] [place] [address], PT [facility]   3/31/2022 11:00 AM [facility]

## 2022-02-23 ENCOUNTER — HOSPITAL ENCOUNTER (OUTPATIENT)
Dept: PHYSICAL THERAPY | Age: 32
Discharge: HOME | End: 2022-02-23
Payer: MEDICAID

## 2022-02-23 PROCEDURE — 97110 THERAPEUTIC EXERCISES: CPT

## 2022-02-23 PROCEDURE — 97530 THERAPEUTIC ACTIVITIES: CPT

## 2022-02-23 PROCEDURE — 97116 GAIT TRAINING THERAPY: CPT

## 2022-02-23 NOTE — PROGRESS NOTES
PHYSICAL THERAPY - DAILY TREATMENT NOTE    Patient Name: Oralia Ybarra        Date: 2022  : 1990   YES Patient  Verified  Visit #:   3   of     Insurance: Payor: BLUE CROSS MEDICAID / Plan: [address] [place] [address] / Product Type: Managed Care Medicaid /      In time: 11:45 Out time: 12:25   Total Treatment Time: 40     Medicare/BCBS [place] Time Tracking (below)   Total Timed Codes (min):  40 1:1 Treatment Time:  40     TREATMENT AREA =  Right knee pain [M25.561]  Postoperative pain of right knee [G89.18, M25.561]    SUBJECTIVE    Pain Level (on 0 to 10 scale):  0  / 10   Medication Changes/New allergies or changes in medical history, any new surgeries or procedures? NO    If yes, update Summary List   Subjective Functional Status/Changes:  []  No changes reported     Pt states she was only a little sore after exercises yesterday. Pt reports she is driving, doesn't bother her right leg at all. OBJECTIVE  Therapeutic Procedures:  Min Procedure Specifics + Rationale   17 [x] Therapeutic Exercise   [x]  See Flowsheet   Rationale: increase ROM and increase strength to improve the patients ability to participate in ADL's    15 [x] Therapeutic Activity   [x]  See Flowsheet    Rationale: To improve safety, proprioception, coordination, and efficiency with tasks   8 [x]  Gait Training   Rationale: Normalize gait, increase proprioceptive and kinesthetic awareness, coordination, balance         min Patient Education:  YES  Reviewed HEP   []  Progressed/Changed HEP based on:   Patient with no questions, continue same HEP     Other Objective/Functional Measures:    Reviewed locking/unlocking brace. Educated pt to lock knee brace in extension for SLR exercise, okay to unlock for knee flexion with ambulation. Reviewed heel to toe gait with knee brace unlocked, pt demo's good gait mechanics and no c/o. Added standing hip flex and extension for right hip strengthening. Post Treatment Pain Level (on 0 to 10) scale:   0-1  / 10     ASSESSMENT    Assessment/Changes in Function:     Pt with good tolerance to progression of gait with knee brace unlocked. []  See Progress Note/Recertification   Patient will continue to benefit from skilled PT services to modify and progress therapeutic interventions, address functional mobility deficits, address ROM deficits, address strength deficits, analyze and address soft tissue restrictions, analyze and cue movement patterns, assess and modify postural abnormalities, and instruct in home and community integration to attain remaining goals. Progress toward goals / Updated goals:    1. Patient to be adherent to HEP to facilitate pain control with ADL's.  2. Patient to be compliant towards precautions to ensure proper healing to transition to next phase of protocol. 3. Patient to demonstrate right knee PROM extension to 0 degrees   4.  Patient to be able to perform SLR on right LE without extensor lag indicating improved quad control in preparation for return to a normalized gait. standing hip flexion for strength      PLAN    []  Upgrade activities as tolerated YES Continue plan of care   []  Discharge due to :    []  Other:      Therapist: Thomas Carreon PTA    Date: 2/23/2022 Time: 3:00 PM     Future Appointments   Date Time Provider Elizabeth Gage   3/1/2022 11:00 AM Nanetta Clamp, PT [facility]   3/3/2022 11:00 AM Nanetta Clamp, PT [facility]   3/8/2022 11:00 AM Orlando Fulling, PT [facility]   3/10/2022 11:00 AM Orlando Fulling, PT [facility]   3/15/2022 11:00 AM Nanetta Clamp, PT [facility]   3/17/2022 11:00 AM Nanetta Clamp, PT [facility]   3/22/2022 11:00 AM Nanetta Clamp, PT [facility]   3/24/2022 11:00 AM Delight Fulling, PT [facility]   3/29/2022 11:00 AM Krish, [address], PT [facility]   3/31/2022 11:00 AM Saulo Unger, PT [facility]

## 2022-02-24 ENCOUNTER — APPOINTMENT (OUTPATIENT)
Dept: PHYSICAL THERAPY | Age: 32
End: 2022-02-24
Payer: MEDICAID

## 2022-03-01 ENCOUNTER — HOSPITAL ENCOUNTER (OUTPATIENT)
Dept: PHYSICAL THERAPY | Age: 32
Discharge: HOME | End: 2022-03-01
Payer: MEDICAID

## 2022-03-01 PROCEDURE — 97110 THERAPEUTIC EXERCISES: CPT

## 2022-03-01 PROCEDURE — 97112 NEUROMUSCULAR REEDUCATION: CPT

## 2022-03-01 PROCEDURE — 97530 THERAPEUTIC ACTIVITIES: CPT

## 2022-03-01 PROCEDURE — 97014 ELECTRIC STIMULATION THERAPY: CPT

## 2022-03-01 NOTE — PROGRESS NOTES
PHYSICAL THERAPY - DAILY TREATMENT NOTE    Patient Name: Oralia Ybarra        Date: 3/1/2022  : 1990   YES Patient  Verified  Visit #:   4     Insurance: Payor: BLUE CROSS MEDICAID / Plan: [address] [place] [address] / Product Type: Managed Care Medicaid /      In time: 11:03 Out time: 11:56   Total Treatment Time: 53     Medicare/BCBS Time Tracking (below)   Total Timed Codes (min):  53 1:1 Treatment Time:  38     TREATMENT AREA = Right knee pain [M25.561]  Postoperative pain of right knee [G89.18, M25.561]    SUBJECTIVE    Pain Level (on 0 to 10 scale):  0  / 10   Medication Changes/New allergies or changes in medical history, any new surgeries or procedures? NO    If yes, update Summary List   Subjective Functional Status/Changes:  []  No changes reported     \"It feels weak but it's not bad. \"          OBJECTIVE     Therapeutic Procedures:  Min Procedure Specifics + Rationale   n/a [x]  Patient Education (performed throughout session) [x] Review HEP    [] Progressed/Changed HEP based on:   [] proper performance and advancement of Therex/TA   [] reduction in pain level    [] increased functional capacity       [] change in directional preference   15 [x] Therapeutic Exercise   [x]  See Flowsheet   Rationale: increase ROM and increase strength to improve the patients ability to participate in ADL's    15 [x] Therapeutic Activity   [x]  See Flowsheet    Rationale:  To improve safety, proprioception, coordination, and efficiency with tasks   8 [x] Neuromuscular Re-ed   [x]  See Flowsheet    Rationale: increase ROM, increase strength, improve coordination, improve balance and increase proprioception  to improve the patients ability to safely participate in ADL's     Modality rationale: decrease inflammation, decrease pain, increase tissue extensibility and increase muscle contraction/control to improve the patients ability to perform ADL's with greater ease     Min Type Additional Details   15 [x] E-Stim Type:Cro[place]ian  Attended? NO Location: Right quad  [x] supine              [] prone  [] legs elevated   [] legs flat  [] sitting   [] sidelying - [] left [] right  [] with heat  [x] with ice  [] Vasopneumatic Device    [x] Skin assessment post-treatment:  [x]intact [x]redness- no adverse reaction       []redness  adverse reaction:     Other Objective/Functional Measures: Added and advanced therex per flow sheet. Post Treatment Pain Level (on 0 to 10) scale:   0  / 10     ASSESSMENT    Assessment/Changes in Function:       Improved Quadriceps strength as patient now able to perform SLR, though with lag         Patient will continue to benefit from skilled PT services to modify and progress therapeutic interventions, address functional mobility deficits, address ROM deficits, address strength deficits, analyze and address soft tissue restrictions, analyze and cue movement patterns, analyze and modify body mechanics/ergonomics, assess and modify postural abnormalities, address imbalance/dizziness and instruct in home and community integration  to attain remaining goals   Progress toward goals / Updated goals:    Compliant to HEP     PLAN    [x]  Upgrade activities as tolerated  [x]  Update interventions per flow sheet YES Continue plan of care   []  Discharge due to :    []  Other:      Therapist: Blanquita Peterson \"BJ\" Maldonado Camara DPT, Cert. MDT, Cert. DN, Cert. SMT, Dip.  Osteopractic    Date: 3/1/2022 Time: 10:58 AM     Future Appointments   Date Time Provider Elizabeth Gage   3/1/2022 11:00 AM Georgiana Robin PT [facility]   3/3/2022 11:00 AM Georgiana Robin PT [facility]   3/8/2022 11:00 AM Mikie Pelayo PT [facility]   3/10/2022 11:00 AM Mikie Pelayo, PT [facility]   3/15/2022 11:00 AM Georgiana Robin PT [facility]   3/17/2022 11:00 AM Georgiana Robin PT [facility]   3/22/2022 11:00 AM Georgiana Robin, PT [facility]   3/24/2022 11:00 AM [facility]   3/29/2022 11:00 AM Krish, Alberto Monk [facility]   3/31/2022 11:00 AM Chantelle Rutherford, PT [facility]

## 2022-03-03 ENCOUNTER — HOSPITAL ENCOUNTER (OUTPATIENT)
Dept: PHYSICAL THERAPY | Age: 32
Discharge: HOME | End: 2022-03-03
Payer: MEDICAID

## 2022-03-03 PROCEDURE — 97014 ELECTRIC STIMULATION THERAPY: CPT

## 2022-03-03 PROCEDURE — 97110 THERAPEUTIC EXERCISES: CPT

## 2022-03-03 PROCEDURE — 97112 NEUROMUSCULAR REEDUCATION: CPT

## 2022-03-03 PROCEDURE — 97530 THERAPEUTIC ACTIVITIES: CPT

## 2022-03-03 NOTE — PROGRESS NOTES
PHYSICAL THERAPY - DAILY TREATMENT NOTE    Patient Name: Rosie Acevedo        Date: 3/3/2022  : 1990   YES Patient  Verified  Visit #:     Insurance: Payor: BLUE CROSS MEDICAID / Plan: [address] [place] [address] / Product Type: Managed Care Medicaid /      In time: 10:50 Out time: 11:45   Total Treatment Time: 55     Medicare/BCBS Time Tracking (below)   Total Timed Codes (min):  55 1:1 Treatment Time:  55     TREATMENT AREA = Right knee pain [M25.561]  Postoperative pain of right knee [G89.18, M25.561]    SUBJECTIVE    Pain Level (on 0 to 10 scale):  0  / 10   Medication Changes/New allergies or changes in medical history, any new surgeries or procedures? NO    If yes, update Summary List   Subjective Functional Status/Changes:  []  No changes reported     \"The new exercises are hard but doable. \"          OBJECTIVE     Therapeutic Procedures:  Min Procedure Specifics + Rationale   n/a [x]  Patient Education (performed throughout session) [x] Review HEP    [] Progressed/Changed HEP based on:   [] proper performance and advancement of Therex/TA   [] reduction in pain level    [] increased functional capacity       [] change in directional preference   15 [x] Therapeutic Exercise   [x]  See Flowsheet   Rationale: increase ROM and increase strength to improve the patients ability to participate in ADL's    15 [x] Therapeutic Activity   [x]  See Flowsheet    Rationale:  To improve safety, proprioception, coordination, and efficiency with tasks   15 [x] Neuromuscular Re-ed   [x]  See Flowsheet  Rationale: increase ROM, increase strength, improve coordination, improve balance and increase proprioception  to improve the patients ability to safely participate in ADL's     Modality rationale: decrease inflammation, decrease pain, increase tissue extensibility and increase muscle contraction/control to improve the patients ability to perform ADL's with greater ease     Min Type Additional Details   10 [x] E-Stim Type:South African  Attended? NO Location: right knee  [x] supine              [] prone  [] legs elevated   [] legs flat  [] sitting   [] sidelying - [] left [] right  [] with heat  [x] with ice  [] Vasopneumatic Device    [x] Skin assessment post-treatment:  [x]intact [x]redness- no adverse reaction       []redness  adverse reaction:     Other Objective/Functional Measures: Added new therex per flow sheet     Post Treatment Pain Level (on 0 to 10) scale:   0  / 10     ASSESSMENT    Assessment/Changes in Function:       Progressing in quad control         Patient will continue to benefit from skilled PT services to modify and progress therapeutic interventions, address functional mobility deficits, address ROM deficits, address strength deficits, analyze and address soft tissue restrictions, analyze and cue movement patterns, analyze and modify body mechanics/ergonomics, assess and modify postural abnormalities, address imbalance/dizziness and instruct in home and community integration  to attain remaining goals   Progress toward goals / Updated goals:    Good progress to return to normal gait. PLAN    [x]  Upgrade activities as tolerated  [x]  Update interventions per flow sheet YES Continue plan of care   []  Discharge due to :    []  Other:      Therapist: Cecy Newsome \"BJ\" Sharon, JAVON, Cert. MDT, Cert. DN, Cert. SMT, Dip.  Osteopractic    Date: 3/3/2022 Time: 11:00 AM     Future Appointments   Date Time Provider Elizabeth Gage   3/8/2022 11:00 AM Anusha Neal PT [facility]   3/10/2022 11:00 AM Anusha Neal PT [facility]   3/15/2022 11:00 AM Senthil Hernandez, PT [facility]   3/17/2022 11:00 AM Senthil Hernandez, PT [facility]   3/22/2022 11:00 AM Senthil Hernandez PT [facility]   3/24/2022 11:00 AM JEANNE Hobbs [facility]   3/29/2022 11:00 AM Anusha Neal, PT [facility]   3/31/2022 11:00 AM [facility]ford[facility]

## 2022-03-08 ENCOUNTER — HOSPITAL ENCOUNTER (OUTPATIENT)
Dept: PHYSICAL THERAPY | Age: 32
Discharge: HOME | End: 2022-03-08
Payer: MEDICAID

## 2022-03-08 PROCEDURE — 97110 THERAPEUTIC EXERCISES: CPT

## 2022-03-08 PROCEDURE — 97116 GAIT TRAINING THERAPY: CPT

## 2022-03-08 PROCEDURE — 97530 THERAPEUTIC ACTIVITIES: CPT

## 2022-03-08 PROCEDURE — 97112 NEUROMUSCULAR REEDUCATION: CPT

## 2022-03-08 NOTE — PROGRESS NOTES
PHYSICAL THERAPY - DAILY TREATMENT NOTE    Patient Name: Oralia Ybarra        Date: 3/8/2022  : 1990   YES Patient  Verified  Visit #:     Insurance: Payor: BLUE CROSS MEDICAID / Plan: [address] [place] [address] / Product Type: Managed Care Medicaid /      In time: 11:00 Out time: 12:08   Total Treatment Time: 68     Medicare/BCBS [place] Time Tracking (below)   Total Timed Codes (min):  68 1:1 Treatment Time:  58     TREATMENT AREA =  Right knee pain [M25.561]  Postoperative pain of right knee [G89.18, M25.561]    SUBJECTIVE    Pain Level (on 0 to 10 scale):  0  / 10   Medication Changes/New allergies or changes in medical history, any new surgeries or procedures? NO    If yes, update Summary List   Subjective Functional Status/Changes:  []  No changes reported     Pt currently 5 weeks post op. Ambulates into clinic no AD wearing immobilization brace unlocked. Pt had f/u with surgeon, she reports went well and to wear brace for another week and a half. Was measured for new brace yesterday and plans to obtain in approx 2 weeks. Next f/u scheduled for . OBJECTIVE    20 min Therapeutic Exercise:  [x]  See flow sheet   Rationale:      increase ROM and increase strength to improve the patients ability to perform ADL's with greater ease. 20 min Therapeutic Activity: See flow sheet   Rationale:   increase mobility, endurance, and strength to improve patient's ability to complete functional tasks with greater ease. 20 min Neuromuscular Re-ed: See flow sheet   Rationale:   improve balance, coordination, proprioception to improve patient's ability to complete functional tasks safely.    8 min Gait training: See flow sheet  Resisted side stepping  HR   Rationale:   increase ROM, increase strength and improve coordination to improve the patients ability to safely perform ADLs, bending/stooping/ lifting; perform prolong sitting/standing/ambulation; and negotiate stairs with no pain or limitations     Modalities Rationale:    increase muscle contraction/control to improve patient's ability to complete functional tasks with less pain. 12 (billed under [place]) min [x] Estim, type/location: [place]; cycle time 10/10, ramp 2\", duty cycle 50%, intensity 33. Placement at VMO and mid Rectus femoris - cues to perform QS during entirety                                     []  att     [x]  unatt     []  w/US     []  w/ice    []  w/heat   [x] Skin assessment post-treatment (if applicable):    [x]  intact    []  redness- no adverse reaction     []redness  adverse reaction:       min Patient Education:  YES  Reviewed HEP   []  Progressed/Changed HEP based on:   Cont with HEP     Other Objective/Functional Measures:    Progressed reps for majority of ex to promote strength. Cont with SLR with brace on due presence of mild ext lag. QS completed sitting with knee at 90* strapped to table. Good visible muscle contraction engaged with [place] stimulation today, placement at distal VMO and mid rectus femoris. Post Treatment Pain Level (on 0 to 10) scale:   0  / 10     ASSESSMENT    Assessment/Changes in Function:     Tolerated exercises and progression well reports of quadriceps ms fatigue, no pain. []  See Progress Note/Recertification   Patient will continue to benefit from skilled PT services to analyze, cue, progress, modify,, demonstrate, instruct, and address, movement patterns, therapeutic interventions, postural abnormalities, soft tissue restrictions, ROM, strength, functional mobility, body mechanics/ergonomics, and home and community integration, to attain remaining goals. Progress toward goals / Updated goals:    1. Patient to be adherent to HEP to facilitate pain control with ADL's.  2. Patient to be compliant towards precautions to ensure proper healing to transition to next phase of protocol.   3. Patient to demonstrate right knee PROM extension to 0 degrees 4. Patient to be able to perform SLR on right LE without extensor lag indicating improved quad control in preparation for return to a normalized gait.        PLAN    []  Upgrade activities as tolerated YES Continue plan of care   []  Discharge due to :    []  Other:      Therapist: Trudy Gaffney, PT, DPT    Date: 3/8/2022 Time: 11:07 AM     Future Appointments   Date Time Provider Elizabeth Gage   3/10/2022 11:00 AM Karel Hillman PT [facility]   3/15/2022 11:00 AM Yared Barrera PT [facility]   3/17/2022 11:00 AM Yared Barrera PT [facility]   3/22/2022 11:00 AM Yared Barrera PT [facility]   3/24/2022 11:00 AM Karel Hillman PT [facility]   3/29/2022 11:00 AM Karel Hillman PT [facility]   3/31/2022 11:00 AM Chantelle Rutherford, PT [facility]

## 2022-03-10 ENCOUNTER — HOSPITAL ENCOUNTER (OUTPATIENT)
Dept: PHYSICAL THERAPY | Age: 32
Discharge: HOME | End: 2022-03-10
Payer: MEDICAID

## 2022-03-10 PROCEDURE — 97112 NEUROMUSCULAR REEDUCATION: CPT

## 2022-03-10 PROCEDURE — 97530 THERAPEUTIC ACTIVITIES: CPT

## 2022-03-10 PROCEDURE — 97116 GAIT TRAINING THERAPY: CPT

## 2022-03-10 PROCEDURE — 97110 THERAPEUTIC EXERCISES: CPT

## 2022-03-10 NOTE — PROGRESS NOTES
PHYSICAL THERAPY - DAILY TREATMENT NOTE    Patient Name: Perry Martinez        Date: 3/10/2022  : 1990   YES Patient  Verified  Visit #:     Insurance: Payor: BLUE CROSS MEDICAID / Plan: [address] [place] [address] / Product Type: Managed Care Medicaid /      In time: 11:00 Out time: 12:13   Total Treatment Time: 73     Medicare/BCBS [place] Time Tracking (below)   Total Timed Codes (min):  63 1:1 Treatment Time:  63     TREATMENT AREA =  Right knee pain [M25.561]  Postoperative pain of right knee [G89.18, M25.561]    SUBJECTIVE    Pain Level (on 0 to 10 scale):  1  / 10   Medication Changes/New allergies or changes in medical history, any new surgeries or procedures? NO    If yes, update Summary List   Subjective Functional Status/Changes:  []  No changes reported     I felt a tingling in my quad for about 20 minutes after leaving last time. Denies any increase pain after previous visit. OBJECTIVE    20 min Therapeutic Exercise:  [x]  See flow sheet   Rationale:      increase ROM and increase strength to improve the patients ability to perform ADL's with greater ease. 20 min Therapeutic Activity: See flow sheet   Rationale:   increase mobility, endurance, and strength to improve patient's ability to complete functional tasks with greater ease. 25 min Neuromuscular Re-ed: See flow sheet  Astym for desensitization    Rationale:   improve balance, coordination, proprioception to improve patient's ability to complete functional tasks safely. 8 min Gait training: See flow sheet   Rationale:    increase ROM, increase strength and improve coordination to improve the patients ability to safely perform ADLs, bending/stooping/ lifting; perform prolong sitting/standing/ambulation; and negotiate stairs with no pain or limitations     Modalities Rationale:    increase muscle contraction/control to improve patient's ability to complete functional tasks with less pain. 12  Billed under NE min [x] Estim, type/location: [facility]; cycle time 10/10, ramp 2\", duty cycle 50%, intensity 33. Placement at distal VMO and mid Rectus femoris - cues to perform QS during on phase                                       []  att     [x]  unatt     []  w/US     []  w/ice    []  w/heat   [x] Skin assessment post-treatment (if applicable):    [x]  intact    []  redness- no adverse reaction     []redness  adverse reaction:       min Patient Education:  YES  Reviewed HEP   [x]  Progressed/Changed HEP based on:   Updated HEP     Other Objective/Functional Measures:    Pt with significant hypersensitivity at medial knee and medial lower leg. Introduced Astym to aide in desensitization; included scar and patellar mobs to continue with independently at home    Cont progressing with reps today as pt tolerated previous visit well, strength deficits throughout R hip girdle. SLR performed with brace in locked position. Poor squatting abilities due to significant quad weakness. Improvement with repeition and verbal cues. Able to complete pain free to approx 45*. Look to continue with squatting and consider including stair training next visit. Post Treatment Pain Level (on 0 to 10) scale:   0  / 10     ASSESSMENT    Assessment/Changes in Function:     Session tolerated well overall with ms fatigue noted. Visible quadriceps weakness limiting functional tasks such as squatting and stair negotiation tasks. Significant hypersensitivity at medial knee/medial lower leg.  Updated HEP to promote strengthening and provided education on how to continue with desensitization at home.      []  See Progress Note/Recertification   Patient will continue to benefit from skilled PT services to analyze, cue, progress, modify,, demonstrate, instruct, and address, movement patterns, therapeutic interventions, postural abnormalities, soft tissue restrictions, ROM, strength, functional mobility, body mechanics/ergonomics, and home and community integration, to attain remaining goals. Progress toward goals / Updated goals:    1. Patient to be adherent to HEP to facilitate pain control with ADL's.  2. Patient to be compliant towards precautions to ensure proper healing to transition to next phase of protocol. 3. Patient to demonstrate right knee PROM extension to 0 degrees   4. Patient to be able to perform SLR on right LE without extensor lag indicating improved quad control in preparation for return to a normalized gait.        PLAN    []  Upgrade activities as tolerated YES Continue plan of care   []  Discharge due to :    []  Other:      Therapist: Spring Muñoz, PT, DPT    Date: 3/10/2022 Time: 7:59 AM     Future Appointments   Date Time Provider Elizabeth Gage   3/10/2022 11:00 AM Nikki Alvarado PT [facility]   3/15/2022 11:00 AM Teresita Troncoso PT [facility]   3/17/2022 11:00 AM Teresita Troncoso PT [facility]   3/22/2022 11:00 AM Teresita Troncoso PT [facility]   3/24/2022 11:00 AM Nikki Alvarado PT [facility]   3/29/2022 11:00 AM Nikki Alvarado PT [facility]   3/31/2022 11:00 AM Chantelle Rutherford, PT [facility]

## 2022-03-15 ENCOUNTER — HOSPITAL ENCOUNTER (OUTPATIENT)
Dept: PHYSICAL THERAPY | Age: 32
Discharge: HOME | End: 2022-03-15
Payer: MEDICAID

## 2022-03-15 PROCEDURE — 97110 THERAPEUTIC EXERCISES: CPT

## 2022-03-15 PROCEDURE — 97014 ELECTRIC STIMULATION THERAPY: CPT

## 2022-03-15 PROCEDURE — 97530 THERAPEUTIC ACTIVITIES: CPT

## 2022-03-15 PROCEDURE — 97112 NEUROMUSCULAR REEDUCATION: CPT

## 2022-03-15 NOTE — PROGRESS NOTES
PHYSICAL THERAPY - DAILY TREATMENT NOTE    Patient Name: Cody Lucio        Date: 3/15/2022  : 1990   YES Patient  Verified  Visit #:   8      12  Insurance: Payor: BLUE CROSS MEDICAID / Plan: [facility] HEALTHKEEPERS PLUS / Product Type: Managed Care Medicaid /      In time: 10:48 Out time: 11:52   Total Treatment Time: 64     Medicare/BCBS Time Tracking (below)   Total Timed Codes (min):  64 1:1 Treatment Time:  54     TREATMENT AREA = Right knee pain [M25.561]  Postoperative pain of right knee [G89.18, M25.561]    SUBJECTIVE    Pain Level (on 0 to 10 scale):  1  / 10   Medication Changes/New allergies or changes in medical history, any new surgeries or procedures? NO    If yes, update Summary List   Subjective Functional Status/Changes:  []  No changes reported     \"I was able to climb yesterday (rope). I did easy routes, I couldn't do some movements but overall it was pretty good. I couldn't reach my leg straight and base off it or hike my knee/hip up. \"   \"I just got this new brace in the mail. It had tiny instructions but didn't say anything about the numbers\"     OBJECTIVE     Therapeutic Procedures:  Min Procedure Specifics + Rationale   n/a [x]  Patient Education (performed throughout session) [x] Review HEP    [] Progressed/Changed HEP based on:   [] proper performance and advancement of Therex/TA   [] reduction in pain level    [] increased functional capacity       [] change in directional preference   16 [x] Therapeutic Exercise   [x]  See Flowsheet   Rationale: increase ROM and increase strength to improve the patients ability to participate in ADL's    22 [x] Therapeutic Activity   [x]  See Flowsheet  HK/Retro/Monster Walk/Sidestepping  Rationale:  To improve safety, proprioception, coordination, and efficiency with tasks   16 [x] Neuromuscular Re-ed   [x]  See Flowsheet    Rationale: increase ROM, increase strength, improve coordination, improve balance and increase proprioception  to improve the patients ability to safely participate in ADL's       Modality rationale: decrease inflammation, decrease pain, increase tissue extensibility and increase muscle contraction/control to improve the patients ability to perform ADL's with greater ease     Min Type Additional Details   10 [x] E-Stim Type:Bhutanese  Attended? NO Location: right thigh  [x] supine              [] prone  [] legs elevated   [x] legs flat  [] sitting   [] sidelying - [] left [] right  [] with heat  [] with ice  [] Vasopneumatic Device    [x] Skin assessment post-treatment:  [x]intact [x]redness- no adverse reaction       []redness  adverse reaction:       Other Objective/Functional Measures:    Increased reps/sets/resistance per flow sheet. Performed Leg Press (25# with 5 RIR) and 35# (3-4 RIR) and 40# (2 RIR)  Initiated step ups. Educated patient re: how to lock/secure brace. Post Treatment Pain Level (on 0 to 10) scale:   0  / 10     ASSESSMENT    Assessment/Changes in Function:       Increased strength and stability noted. Patient will continue to benefit from skilled PT services to modify and progress therapeutic interventions, address functional mobility deficits, address ROM deficits, address strength deficits, analyze and address soft tissue restrictions, analyze and cue movement patterns, analyze and modify body mechanics/ergonomics, assess and modify postural abnormalities, address imbalance/dizziness and instruct in home and community integration  to attain remaining goals   Progress toward goals / Updated goals:    Progressing well in balance and performance of therex. PLAN    [x]  Upgrade activities as tolerated  [x]  Update interventions per flow sheet YES Continue plan of care   []  Discharge due to :    []  Other:      Therapist: Miladis Bermeo \"RENATA\" JAVON Herrera, Cert. MDT, Cert. DN, Cert. SMT, Dip.  Osteopractic    Date: 3/15/2022 Time: 10:58 AM     Future Appointments   Date Time Provider Elizabeth Gage   3/15/2022 11:00 AM Ruth Roque, PT [facility]   3/17/2022 11:00 AM Ruth Roque, PT [facility]   3/22/2022 11:00 AM Ruth Roque, PT [facility]   3/24/2022 11:00 AM Gnearo Ventura, PT [facility]   3/29/2022 11:00 AM Genaro Ventura, PT [facility]   3/31/2022 11:00 AM Chantelle Rutherford, PT [facility]

## 2022-03-17 ENCOUNTER — APPOINTMENT (OUTPATIENT)
Dept: PHYSICAL THERAPY | Age: 32
End: 2022-03-17
Payer: MEDICAID

## 2022-03-22 ENCOUNTER — HOSPITAL ENCOUNTER (OUTPATIENT)
Dept: PHYSICAL THERAPY | Age: 32
Discharge: HOME | End: 2022-03-22
Payer: MEDICAID

## 2022-03-22 PROCEDURE — 97110 THERAPEUTIC EXERCISES: CPT

## 2022-03-22 PROCEDURE — 97112 NEUROMUSCULAR REEDUCATION: CPT

## 2022-03-22 PROCEDURE — 97014 ELECTRIC STIMULATION THERAPY: CPT

## 2022-03-22 PROCEDURE — 97530 THERAPEUTIC ACTIVITIES: CPT
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
## 2022-03-22 NOTE — PROGRESS NOTES
[address]  [facility] PHYSICAL THERAPY  [address] [facility] [address], [address] - Phone: [phone]  Fax: [phone]  PROGRESS NOTE  Patient Name: Avani Lucio : 1990   Treatment/Medical Diagnosis: Right knee pain [M25.561]  Postoperative pain of right knee [G89.18, M25.561]   Referral Source: Martin Chirinos DO     Date of Initial Visit: 22 Attended Visits: 9 Missed Visits: 1     SUMMARY OF TREATMENT  Patient's POC has consisted of therex, therapeutic activities, manual therapy prn, modalities prn, pt. education, and a comprehensive HEP. Treatment strategies used to address functional mobility deficits, ROM deficits, strength deficits, analyze and address soft tissue restrictions, analyze and cue movement patterns, analyze and modify body mechanics/ergonomics, assess and modify postural abnormalities and instruct in home and community integration. CURRENT STATUS  Patient making excellent progress, now demontsrating AROM 0-132 degrees, PROM 0-136 degrees. She has received and is wearing her new brace at work and while rope climbing. Patient averages 0-1/10 pain without stress. Goal/Measure of Progress Goal Met? ·   1. Patient to be adherent to HEP to facilitate pain control with ADL's.  2. Patient to be compliant towards precautions to ensure proper healing to transition to next phase of protocol. 3. Patient to demonstrate right knee PROM extension to 0 degrees   4. Patient to be able to perform SLR on right LE without extensor lag indicating improved quad control in preparation for return to a normalized gait. MET    MET      MET    MET     New Goals to be achieved in __4__  weeks:  1. Patient to be Safe and Independent with HEP to self-manage/prevent symptoms after DC. 2. Patient to increase FS FOTO score to > 57 to indicate increased functional independence. 3. Patient to demonstrate normalized gait without AD.   Frequency / Duration: Patient to be seen  2  times per week for 4  weeks:    RECOMMENDATIONS  Continue and progress functional therex/therapeutic activity per protocol. If you have any questions/comments please contact us directly at [phone]. Thank you for allowing us to assist in the care of your patient. Therapist Signature: Darron Omalley \"BJ\" Pineda Hurley, DPT, Cert. MDT, Cert. DN, Cert. SMT, Dip. Osteopractic Date: 6/66/1878   Certification Period: n/a     Reporting Period n/a   Time: 10:50 AM   NOTE TO PHYSICIAN:  PLEASE COMPLETE THE ORDERS BELOW AND FAX TO   [facility] Physical Therapy: [phone]. If you are unable to process this request in 24 hours please contact our office: [phone].    ___ I have read the above report and request that my patient continue as recommended.   ___ I have read the above report and request that my patient continue therapy with the following changes/special instructions:_________________________________________________________   ___ I have read the above report and request that my patient be discharged from therapy.      Physician Signature:        Date:       Time:                                        Padmaja Quintero DO

## 2022-03-24 ENCOUNTER — APPOINTMENT (OUTPATIENT)
Dept: PHYSICAL THERAPY | Age: 32
End: 2022-03-24
Payer: MEDICAID

## 2022-03-25 ENCOUNTER — HOSPITAL ENCOUNTER (OUTPATIENT)
Dept: PHYSICAL THERAPY | Age: 32
Discharge: HOME | End: 2022-03-25
Payer: MEDICAID

## 2022-03-25 PROCEDURE — 97110 THERAPEUTIC EXERCISES: CPT

## 2022-03-25 PROCEDURE — 97116 GAIT TRAINING THERAPY: CPT

## 2022-03-25 PROCEDURE — 97530 THERAPEUTIC ACTIVITIES: CPT

## 2022-03-25 NOTE — PROGRESS NOTES
PHYSICAL THERAPY - DAILY TREATMENT NOTE    Patient Name: Beatriz Iglesias        Date: 3/25/2022  : 1990   YES Patient  Verified  Visit #:   10   of   16  Insurance: Payor: BLUE CROSS MEDICAID / Plan: [facility] [employer] HEALTHKEEPERS PLUS / Product Type: Managed Care Medicaid /      In time: 10:09 Out time: 10:57   Total Treatment Time: 48     Medicare/BCBS [place] Time Tracking (below)   Total Timed Codes (min):  48 1:1 Treatment Time:  48     TREATMENT AREA =  Right knee pain [M25.561]  Postoperative pain of right knee [G89.18, M25.561]    SUBJECTIVE    Pain Level (on 0 to 10 scale):  2  / 10   Medication Changes/New allergies or changes in medical history, any new surgeries or procedures? NO    If yes, update Summary List   Subjective Functional Status/Changes:  []  No changes reported     Pt reports started new job yesterday as , was on her feet for about 5 hours 45 minutes and her right knee is a little sore from it. Pt c/o pain in back of knee with lifting right leg in and out of the car. Pt states inside of right thigh just feels tight. OBJECTIVE    Therapeutic Procedures:  Min Procedure Specifics + Rationale   16 [x] Therapeutic Exercise   [x]  See Flowsheet   Rationale: increase ROM and increase strength to improve the patients ability to participate in ADL's    22 [x] Therapeutic Activity   [x]  See Flowsheet    Rationale: To improve safety, proprioception, coordination, and efficiency with tasks   10 [x]  Gait Training   Rationale: Normalize gait, increase proprioceptive and kinesthetic awareness, coordination, balance        min Patient Education:  YES  Reviewed HEP   []  Progressed/Changed HEP based on:   Advised pt nerve block done in adductor ms. Educated pt to reposition and move frequently with prolonged standing during work activity to prevent joint stiffness. Other Objective/Functional Measures: Added 2# to prone HS curl.      Pt reports tightness in right adductors with quad set and therapeutic activity  with TKE. Post Treatment Pain Level (on 0 to 10) scale:   2  / 10     ASSESSMENT    Assessment/Changes in Function:     Pt reports right knee sore with prolonged standing activity. []  See Progress Note/Recertification   Patient will continue to benefit from skilled PT services to modify and progress therapeutic interventions, address functional mobility deficits, address ROM deficits, address strength deficits, analyze and address soft tissue restrictions, analyze and cue movement patterns, assess and modify postural abnormalities, and instruct in home and community integration to attain remaining goals. Progress toward goals / Updated goals:    1. Patient to be Safe and Independent with HEP to self-manage/prevent symptoms after DC. 2. Patient to increase FS FOTO score to > 57 to indicate increased functional independence. pt reports started standing job yesterday   3. Patient to demonstrate normalized gait without AD.      PLAN    []  Upgrade activities as tolerated YES Continue plan of care   []  Discharge due to :    []  Other:      Therapist: Rice Gilford, PTA    Date: 3/25/2022 Time: 10:01 AM     Future Appointments   Date Time Provider Elizabeth Gage   3/25/2022 10:15 AM Manmary Alvaradop [facility]   3/29/2022 11:00 AM Naima Hood, PT [facility]   3/31/2022 11:00 AM Naima Hood, PT [facility]   4/5/2022  9:30 AM Mancil Rasp [facility]   4/7/2022  9:30 AM Bert Burnette, PT [facility]   4/12/2022  9:30 AM Bert Burnette, PT [facility]   4/14/2022  9:30 AM Mancil Rasp [facility]   4/19/2022  9:30 AM Mancil Rasp [facility]   4/21/2022  9:30 AM Bert Burnette, PT [facility]   4/26/2022  9:30 AM Bert Burnette, PT [facility]   4/28/2022  9:30 AM Bert Burnette, PT [facility]

## 2022-03-29 ENCOUNTER — APPOINTMENT (OUTPATIENT)
Dept: PHYSICAL THERAPY | Age: 32
End: 2022-03-29
Payer: MEDICAID

## 2022-03-31 ENCOUNTER — HOSPITAL ENCOUNTER (OUTPATIENT)
Dept: PHYSICAL THERAPY | Age: 32
Discharge: HOME | End: 2022-03-31
Payer: MEDICAID

## 2022-03-31 PROCEDURE — 97110 THERAPEUTIC EXERCISES: CPT

## 2022-03-31 PROCEDURE — 97530 THERAPEUTIC ACTIVITIES: CPT

## 2022-03-31 PROCEDURE — 97116 GAIT TRAINING THERAPY: CPT

## 2022-03-31 NOTE — PROGRESS NOTES
PHYSICAL THERAPY - DAILY TREATMENT NOTE    Patient Name: Alen Leger        Date: 3/31/2022  : 1990   YES Patient  Verified  Visit #:      16  Insurance: Payor: BLUE CROSS MEDICAID / Plan: [facility] [employer] [facility] / Product Type: Managed Care Medicaid /      In time: 11:00 Out time: 11:58   Total Treatment Time: 58     Medicare/BCBS [place] Time Tracking (below)   Total Timed Codes (min):  na 1:1 Treatment Time:  na     TREATMENT AREA =  Right knee pain [M25.561]  Postoperative pain of right knee [G89.18, M25.561]    SUBJECTIVE    Pain Level (on 0 to 10 scale):  0  / 10   Medication Changes/New allergies or changes in medical history, any new surgeries or procedures? NO    If yes, update Summary List   Subjective Functional Status/Changes:  []  No changes reported     Pt reports her leg feels tired during and after her work shifts (reporst it is 5 hrs of standing/walking)         OBJECTIVE    15 min Therapeutic Exercise:  [x]  See flow sheet   Rationale:      increase ROM and increase strength to improve the patients ability to perform ADL's with greater ease. 20 min Therapeutic Activity: See flow sheet   Rationale:   increase mobility, endurance, and strength to improve patient's ability to complete functional tasks with greater ease. 5 min Neuromuscular Re-ed: See flow sheet   Rationale:   improve balance, coordination, proprioception to improve patient's ability to complete functional tasks safely. 8 min Gait training: See flow sheet   Rationale:    improve balance, coordination, proprioception to improve patient's ability to complete ambulation tasks safely. Modalities Rationale:    decrease inflammation and decrease pain to improve patient's ability to complete functional tasks with less pain.    10 min [x]  Ice     []  Heat    location/position: Right knee post tx with 1/2 foam under heel for additional knee ext stretch (5 min)   [x] Skin assessment post-treatment (if applicable):    [x]  intact    []  redness- no adverse reaction     []redness  adverse reaction:       min Patient Education:  YES  Reviewed HEP   []  Progressed/Changed HEP based on:   Cont with HEP     Other Objective/Functional Measures:    Pt with significant RLE strength deficits. Emphasis on unilateral strengthening to avoid pt from compensating with LLE. Pt challenged with SL leg press 10 lbs, pt able to complete 10 complete reps without pain. 10 squats to approx 80* in depth performed with 10\" isometric hold on tenth repetition. Pt with mild extension lag contributing to altered gait mechanics. Progressed to SLS on airex to improve knee stability/proprioception. Post Treatment Pain Level (on 0 to 10) scale:   1  / 10     ASSESSMENT    Assessment/Changes in Function:     Ms fatigue reported after session, no pain. Cont working to improve strength and stability to address pt goals. []  See Progress Note/Recertification   Patient will continue to benefit from skilled PT services to analyze, cue, progress, modify,, demonstrate, instruct, and address, movement patterns, therapeutic interventions, postural abnormalities, soft tissue restrictions, ROM, strength, functional mobility, body mechanics/ergonomics, and home and community integration, to attain remaining goals. Progress toward goals / Updated goals:    1. Patient to be Safe and Independent with HEP to self-manage/prevent symptoms after DC. 2. Patient to increase FS FOTO score to > 57 to indicate increased functional independence. 3. Patient to demonstrate normalized gait without AD.        PLAN    []  Upgrade activities as tolerated YES Continue plan of care   []  Discharge due to :    []  Other:      Therapist: Arturo Motta PT, DPT    Date: 3/31/2022 Time: 8:11 AM     Future Appointments   Date Time Provider Elizabeth Gage   3/31/2022 11:00 AM Torie Peña PT [facility]   4/5/2022  9:30 AM [facility]   4/7/2022  9:30 AM Lori Hahn, PT [facility]   4/12/2022  9:30 AM Lori Hahn, PT [facility]   4/14/2022  9:30 AM [facility]   4/19/2022  9:30 AM [facility]   4/21/2022  9:30 AM Lori Hahn, PT [facility]   4/26/2022  9:30 AM Lori Hahn, PT [facility]   4/28/2022  9:30 AM Lori Hhan PT [facility]

## 2022-04-05 ENCOUNTER — HOSPITAL ENCOUNTER (OUTPATIENT)
Dept: PHYSICAL THERAPY | Age: 32
Discharge: HOME | End: 2022-04-05
Payer: MEDICAID

## 2022-04-05 PROCEDURE — 97110 THERAPEUTIC EXERCISES: CPT

## 2022-04-05 PROCEDURE — 97530 THERAPEUTIC ACTIVITIES: CPT

## 2022-04-05 PROCEDURE — 97116 GAIT TRAINING THERAPY: CPT

## 2022-04-05 NOTE — PROGRESS NOTES
PHYSICAL THERAPY - DAILY TREATMENT NOTE    Patient Name: Ag Chung        Date: 2022  : 1990   YES Patient  Verified  Visit #:     Insurance: Payor: BLUE CROSS MEDICAID / Plan: [facility] [employer]US / Product Type: Managed Care Medicaid /      In time: 9:30 Out time: 10:12   Total Treatment Time: 42     Medicare/BCBS [place] Time Tracking (below)   Total Timed Codes (min):  42 1:1 Treatment Time:  42     TREATMENT AREA =  Right knee pain [M25.561]  Postoperative pain of right knee [G89.18, M25.561]    SUBJECTIVE    Pain Level (on 0 to 10 scale):  1-2  / 10   Medication Changes/New allergies or changes in medical history, any new surgeries or procedures? NO    If yes, update Summary List   Subjective Functional Status/Changes:  []  No changes reported     Pt reports she has a lot of stress right now with trying to get a full time job and her boyfriend is having car troubles she is trying to help him with. Pt states her knee is still weak. Pt reports her knee was sore after doing a long drive, \"I think it was like 7 hours. \"        OBJECTIVE  Therapeutic Procedures:  Min Procedure Specifics + Rationale   17 [x] Therapeutic Exercise   [x]  See Flowsheet   Rationale: increase ROM and increase strength to improve the patients ability to participate in ADL's    15 [x] Therapeutic Activity   [x]  See Flowsheet    Rationale: To improve safety, proprioception, coordination, and efficiency with tasks   10 [x] Gait training    [x]  See Flowsheet    Rationale: . improve coordination, improve balance, increase proprioception and improve gait pattern to increase safety and [place] with amb to promote increased functional mobility. min Patient Education:  YES  Reviewed HEP   []  Progressed/Changed HEP based on:   Patient with no questions, continue same HEP     Other Objective/Functional Measures:    Pt c/o \"itching\" right lateral knee with mini squats.      Marta Huitron VCing for knee alignment with eccentric heel taps. During prone quad stretch, pt's boyfriend called and she requested to leave treatment secondary to needing to go be with him. Advised pt treatment was pretty much completed, just stretch and ice knee at home, pt agreeable      Post Treatment Pain Level (on 0 to 10) scale:   1  / 10     ASSESSMENT    Assessment/Changes in Function:     Pt reports quad weakness right knee with squatting activity. []  See Progress Note/Recertification   Patient will continue to benefit from skilled PT services to modify and progress therapeutic interventions, address functional mobility deficits, address ROM deficits, address strength deficits, analyze and address soft tissue restrictions, analyze and cue movement patterns, assess and modify postural abnormalities, and instruct in home and community integration to attain remaining goals. Progress toward goals / Updated goals:    1. Patient to be Safe and Independent with HEP to self-manage/prevent symptoms after DC. 2. Patient to increase FS FOTO score to > 57 to indicate increased functional independence knee sore from prolonged driving     3. Patient to demonstrate normalized gait without AD.      PLAN    []  Upgrade activities as tolerated YES Continue plan of care   []  Discharge due to :    []  Other:      Therapist: John Avendano PTA    Date: 4/5/2022 Time: 10:11 AM     Future Appointments   Date Time Provider Elizabeth Gage   4/7/2022  9:30 AM Rogelio Milton PT [facility]   4/12/2022  9:30 AM Rogelio Milton PT [facility]   4/14/2022  9:30 AM Isabelle [facility]   4/19/2022  9:30 AM Isabelle Plover [facility]   4/21/2022  9:30 AM Rogelio Milton PT [facility]   4/26/2022  9:30 AM Rogelio Milton PT [facility]   4/28/2022  9:30 AM [facility]

## 2022-04-07 ENCOUNTER — HOSPITAL ENCOUNTER (OUTPATIENT)
Dept: PHYSICAL THERAPY | Age: 32
Discharge: HOME | End: 2022-04-07
Payer: MEDICAID

## 2022-04-07 PROCEDURE — 97116 GAIT TRAINING THERAPY: CPT

## 2022-04-07 PROCEDURE — 97112 NEUROMUSCULAR REEDUCATION: CPT

## 2022-04-07 PROCEDURE — 97110 THERAPEUTIC EXERCISES: CPT

## 2022-04-07 PROCEDURE — 97530 THERAPEUTIC ACTIVITIES: CPT

## 2022-04-07 NOTE — PROGRESS NOTES
PHYSICAL THERAPY - DAILY TREATMENT NOTE    Patient Name: Leslee Gooden        Date: 2022  : 1990   YES Patient  Verified  Visit #:   15   of   16  Insurance: Payor: BLUE CROSS MEDICAID / Plan: [facility] [employer]KEEPERS PLUS / Product Type: Managed Care Medicaid /      In time: 9:19 Out time: 10:22   Total Treatment Time: 63     Medicare/BCBS Time Tracking (below)   Total Timed Codes (min):  63 1:1 Treatment Time:  53     TREATMENT AREA = Right knee pain [M25.561]  Postoperative pain of right knee [G89.18, M25.561]    SUBJECTIVE    Pain Level (on 0 to 10 scale):  0  / 10   Medication Changes/New allergies or changes in medical history, any new surgeries or procedures? NO    If yes, update Summary List   Subjective Functional Status/Changes:  []  No changes reported     \"It only really hurts when I bend it all the way to get in and out of a car. I started my job and it's exhausting. I'm walking at least 5 hours of it non-stop. \"          OBJECTIVE     Therapeutic Procedures:  Min Procedure Specifics + Rationale   n/a [x]  Patient Education (performed throughout session) [x] Review HEP    [] Progressed/Changed HEP based on:   [] proper performance and advancement of Therex/TA   [] reduction in pain level    [] increased functional capacity       [] change in directional preference   15 [x] Therapeutic Exercise   [x]  See Flowsheet   Rationale: increase ROM and increase strength to improve the patients ability to participate in ADL's    15 [x] Therapeutic Activity   [x]  See Flowsheet  KB farmer, racked,  (10#)  Medicine ball carry (shotput)  Rationale:  To improve safety, proprioception, coordination, and efficiency with tasks   15 [x] Neuromuscular Re-ed   [x]  See Flowsheet  Rationale: increase ROM, increase strength, improve coordination, improve balance and increase proprioception  to improve the patients ability to safely participate in ADL's     8 [x]  Gait Training   Rationale: Normalize gait, increase proprioceptive and kinesthetic awareness, coordination, balance       Modality rationale: decrease inflammation, decrease pain, increase tissue extensibility and increase muscle contraction/control to improve the patients ability to perform ADL's with greater ease     Min Type Additional Details   10 [x]  Cold Pack   [] pre-ROMÁN      [x] post-ROMÁN  []  Ice massage Location:knee    [] supine             [] prone  [] legs elevated  [] legs flat  [] sitting              [] sidelying - [] left [] right   [x] Skin assessment post-treatment:  [x]intact [x]redness- no adverse reaction       []redness  adverse reaction:       Other Objective/Functional Measures:    Leg Press 55# 3x10  Single Leg Press 10# 2x10  Heel Raise on Leg Press 10# and 25#     Post Treatment Pain Level (on 0 to 10) scale:   0  / 10     ASSESSMENT    Assessment/Changes in Function:       Notably poor proprioception on BOSU         Patient will continue to benefit from skilled PT services to modify and progress therapeutic interventions, address functional mobility deficits, address ROM deficits, address strength deficits, analyze and address soft tissue restrictions, analyze and cue movement patterns, analyze and modify body mechanics/ergonomics and instruct in home and community integration  to attain remaining goals   Progress toward goals / Updated goals:    Progressing in ROM and strength     PLAN    [x]  Upgrade activities as tolerated  [x]  Update interventions per flow sheet YES Continue plan of care   []  Discharge due to :    []  Other:      Therapist: Richard Hebert \"BJ\" JAVON DE LA CRUZ, Cert. MDT, Cert. DN, Cert. SMT, Dip.  Osteopractic    Date: 4/7/2022 Time: 9:24 AM     Future Appointments   Date Time Provider Elizabeth Gage   4/7/2022  9:30 AM Lori Hahn PT [facility]   4/12/2022  9:30 AM Lori Hahn PT [facility]   4/14/2022  9:30 AM [facility]   4/19/2022  9:30 AM Patrizia  [facility] 4/21/2022  9:30 AM Melissa Del Rio PT [facility]   4/26/2022  9:30 AM Melissa Del Rio PT [facility]   4/28/2022  9:30 AM [facility]

## 2022-04-12 ENCOUNTER — APPOINTMENT (OUTPATIENT)
Dept: PHYSICAL THERAPY | Age: 32
End: 2022-04-12
Payer: MEDICAID

## 2022-04-13 ENCOUNTER — HOSPITAL ENCOUNTER (OUTPATIENT)
Dept: PHYSICAL THERAPY | Age: 32
Discharge: HOME | End: 2022-04-13
Payer: MEDICAID

## 2022-04-13 PROCEDURE — 97112 NEUROMUSCULAR REEDUCATION: CPT

## 2022-04-13 PROCEDURE — 97530 THERAPEUTIC ACTIVITIES: CPT

## 2022-04-13 PROCEDURE — 97110 THERAPEUTIC EXERCISES: CPT

## 2022-04-13 PROCEDURE — 97116 GAIT TRAINING THERAPY: CPT

## 2022-04-13 NOTE — PROGRESS NOTES
PHYSICAL THERAPY - DAILY TREATMENT NOTE    Patient Name: Jay Ramos        Date: 2022  : 1990   YES Patient  Verified  Visit #:   15   of   16  Insurance: Payor: BLUE CROSS MEDICAID / Plan: [facility] HEALTHKEEPERS PLUS / Product Type: Managed Care Medicaid /      In time: 8:43 Out time: 9:36   Total Treatment Time: 53     Medicare/BCBS Time Tracking (below)   Total Timed Codes (min):  53 1:1 Treatment Time:  53     TREATMENT AREA = Right knee pain [M25.561]  Postoperative pain of right knee [G89.18, M25.561]    SUBJECTIVE    Pain Level (on 0 to 10 scale):  0  / 10   Medication Changes/New allergies or changes in medical history, any new surgeries or procedures? NO    If yes, update Summary List   Subjective Functional Status/Changes:  []  No changes reported     \"The knee is fine. Just frustrated with my mom. \"          OBJECTIVE     Therapeutic Procedures:  Min Procedure Specifics + Rationale   n/a [x]  Patient Education (performed throughout session) [x] Review HEP    [] Progressed/Changed HEP based on:   [] proper performance and advancement of Therex/TA   [] reduction in pain level    [] increased functional capacity       [] change in directional preference   15 [x] Therapeutic Exercise   [x]  See Flowsheet   Rationale: increase ROM and increase strength to improve the patients ability to participate in ADL's    8 [x]  Gait Training       Agility Ladder: Zig Zag, mini-zigzag, in/out sidestep, in/out forward, sidestepping, HK, lateral cammy cammy. Rationale: Normalize gait, increase proprioceptive and kinesthetic awareness, coordination, balance   15 [x] Therapeutic Activity   [x]  See Flowsheet    Rationale:  To improve safety, proprioception, coordination, and efficiency with tasks   15 [x] Neuromuscular Re-ed   [x]  See Flowsheet  March on BOSU  Step to Lunge onto Bosu  BOSU squats  Rationale: increase ROM, increase strength, improve coordination, improve balance and increase proprioception  to improve the patients ability to safely participate in ADL's       Other Objective/Functional Measures:    Leg Press Warm Up = 25/40 x10, 3 working sets @ 55#       Post Treatment Pain Level (on 0 to 10) scale:   0  / 10     ASSESSMENT    Assessment/Changes in Function:       Good proprioception/kinesthetic awareness demonstrated         Patient will continue to benefit from skilled PT services to modify and progress therapeutic interventions, address functional mobility deficits, address ROM deficits, address strength deficits, analyze and address soft tissue restrictions, analyze and cue movement patterns, analyze and modify body mechanics/ergonomics and instruct in home and community integration  to attain remaining goals   Progress toward goals / Updated goals:    Good progress in functional return     PLAN    [x]  Upgrade activities as tolerated  [x]  Update interventions per flow sheet YES Continue plan of care   []  Discharge due to :    []  Other:      Therapist: Jillian Treadwell \"BJ\" [address], DPT, [address]. MDT, Cert. DN, Cert. SMT, Dip.  Osteopractic    Date: 4/13/2022 Time: 8:36 AM     Future Appointments   Date Time Provider Elizabeth Gage   4/13/2022  8:45 AM Valentina Fees, PT [facility]   4/14/2022  9:30 AM Eino Kent [facility]   4/19/2022  9:30 AM Eino Kent [facility]   4/21/2022  9:30 AM Valentina Fees, PT [facility]   4/26/2022  9:30 AM Valentina Fees, PT [facility]   4/28/2022  9:30 AM Valentina Fees, PT [facility]

## 2022-04-14 ENCOUNTER — HOSPITAL ENCOUNTER (OUTPATIENT)
Dept: PHYSICAL THERAPY | Age: 32
Discharge: HOME | End: 2022-04-14
Payer: MEDICAID

## 2022-04-14 PROCEDURE — 97530 THERAPEUTIC ACTIVITIES: CPT

## 2022-04-14 PROCEDURE — 97116 GAIT TRAINING THERAPY: CPT

## 2022-04-14 PROCEDURE — 97110 THERAPEUTIC EXERCISES: CPT

## 2022-04-14 NOTE — PROGRESS NOTES
PHYSICAL THERAPY - DAILY TREATMENT NOTE    Patient Name: Buffy Vargas        Date: 2022  : 1990   YES Patient  Verified  Visit #:   15   of   16  Insurance: Payor: BLUE CROSS MEDICAID / Plan: [facility] [employer] HEALTHKEEPERS PLUS / Product Type: Managed Care Medicaid /      In time: 131 Out time: 10:13   Total Treatment Time: 43     Medicare/BCBS [place] Time Tracking (below)   Total Timed Codes (min):  43 1:1 Treatment Time:  43     TREATMENT AREA =  Right knee pain [M25.561]  Postoperative pain of right knee [G89.18, M25.561]    SUBJECTIVE    Pain Level (on 0 to 10 scale):  0  / 10   Medication Changes/New allergies or changes in medical history, any new surgeries or procedures? NO    If yes, update Summary List   Subjective Functional Status/Changes:  []  No changes reported     Pt reports knee starts to get sore after about 2 hours of standing for work. OBJECTIVE  Therapeutic Procedures:  Min Procedure Specifics + Rationale   18 [x] Therapeutic Exercise   [x]  See Flowsheet   Rationale: increase ROM and increase strength to improve the patients ability to participate in ADL's    15 [x] Therapeutic Activity   [x]  See Flowsheet    Rationale: To improve safety, proprioception, coordination, and efficiency with tasks   10 [x]  Gait Training   Rationale: Normalize gait, increase proprioceptive and kinesthetic awareness, coordination, balance         min Patient Education:  YES  Reviewed HEP   []  Progressed/Changed HEP based on:   Patient with no questions, continue same HEP     Other Objective/Functional Measures:    Right knee AROM 2-135 degrees     MMT right knee extension: 4-/5 with pain, knee flexion 4/5  MMT right hip flexion 4/5 grossly     FOTO 67/100     Pt able to demo good heel to toe gait mechanics during ambulation with therapeutic activity.      Post Treatment Pain Level (on 0 to 10) scale:   0  / 10     ASSESSMENT    Assessment/Changes in Function:     Pt's FOTO score improved 31 points since  initial evaluation indicating improved activity tolerance and correlates to a 33% functional limitation. [x]  See Progress Note/Recertification   Patient will continue to benefit from skilled PT services to modify and progress therapeutic interventions, address functional mobility deficits, address ROM deficits, address strength deficits, analyze and address soft tissue restrictions, analyze and cue movement patterns, assess and modify postural abnormalities, and instruct in home and community integration to attain remaining goals. Progress toward goals / Updated goals:    1. Patient to be Safe and Independent with HEP to self-manage/prevent symptoms after DC. MET  2. Patient to increase FS FOTO score to > 57 to indicate increased functional independence. MET  3. Patient to demonstrate normalized gait without AD. MET     PLAN    []  Upgrade activities as tolerated YES Continue plan of care   []  Discharge due to :    []  Other:      Therapist: Anya Zavala PTA    Date: 4/14/2022 Time: 4:28 PM     Future Appointments   Date Time Provider Elizabeth Gage   4/19/2022  9:30 AM Ryanne Mueller [facility]   4/21/2022  9:30 AM Julio Feliciano, PT [facility]   4/26/2022  9:30 AM Julio Feliciano, PT [facility]   4/28/2022  9:30 AM Julio Feliciano, PT [facility]

## 2022-04-15 NOTE — PROGRESS NOTES
[facility] PHYSICAL THERAPY  [address] [place] [address] Susie Zavala, [address] - Phone: [phone]  Fax: [phone]  PROGRESS NOTE  Patient Name: Ayesha Bond : 1990   Treatment/Medical Diagnosis: Right knee pain [M25.561]  Postoperative pain of right knee [G89.18, M25.561]   Referral Source: Kalani Molina DO     Date of Initial Visit: 21 Attended Visits: 15 Missed Visits: 1     SUMMARY OF TREATMENT  Patient's treatment has included gait training, LE balance and propricpetion exercises, LE strengthening and ROM exercises, pt education including instruction in home exercise program.     CURRENT STATUS  Pt progressing well with exercises in clinic. Pt is currently ambulating with good heel to toe gait mechanics without an assistive device. Pt has been fitted for sports brace and wearing during work activity. Pt has recently started position as  in restaurant and reports mild to moderate knee pain with prolonged standing activity. Pt reports symptoms start after approximately 2 hours of standing. Pt is able to demo SLR without quad lag, but does continue with LE ms weakness:   MMT right knee extension: 4-/5 with pain, knee flexion 4/5  MMT right hip flexion 4/5 grossly     Right knee AROM 2-135 degrees     Goal/Measure of Progress Goal Met?   1.  1. Patient to be Safe and Independent with HEP to self-manage/prevent symptoms after DC. Status at last Eval: Pt compliant with HEP Current Status: Pt I with HEP yes   2.  2. Patient to increase FS FOTO score to > 57 to indicate increased functional independence. Status at last Eval: 36/100 Current Status: 67/100 yes   3.  3. Patient to demonstrate normalized gait without AD. Status at last Eval: impaired heel to toe gait mechanics Current Status: normal heel to toe gait pattern  yes     New Goals to be achieved in __2-4__  weeks:  1.  Patient will report ability to stand for 6 hour work shift with <  3/10 right knee pain to indicate increased activity tolerance. 2. Patient will increase right knee extension strength to > 4+/5 to facilitate climbing and squatting activity. 3. Patient will demo right knee extension to 0 to facilitate terminal knee extension with stance and gait. RECOMMENDATIONS  Pt would benefit from continued therapy to address symptoms of right LE ms weakness and decreased knee extension ROM to facilitate return to prior level of activity including rope climbing and work activity. If you have any questions/comments please contact us directly at [phone]. Thank you for allowing us to assist in the care of your patient. LPTA Signature: June Centeno PTA  Date: 4/15/2022   PT Signature: Imtiaz Lazo \"BJ\" Aron Avendano DPT, Cert. MDT, Cert. DN, Cert. SMT, Dip. Osteopractic Time: 9:13 AM   NOTE TO PHYSICIAN:  PLEASE COMPLETE THE ORDERS BELOW AND FAX TO   [facility] Physical Therapy: [phone]. If you are unable to process this request in 24 hours please contact our office: [phone].    ___ I have read the above report and request that my patient continue as recommended.   ___ I have read the above report and request that my patient continue therapy with the following changes/special instructions:_________________________________________________________   ___ I have read the above report and request that my patient be discharged from therapy.      Physician Signature:        Date:       Time:                                                              Robbie Jameson DO

## 2022-04-19 ENCOUNTER — HOSPITAL ENCOUNTER (OUTPATIENT)
Dept: PHYSICAL THERAPY | Age: 32
Discharge: HOME | End: 2022-04-19
Payer: MEDICAID

## 2022-04-19 PROCEDURE — 97110 THERAPEUTIC EXERCISES: CPT

## 2022-04-19 PROCEDURE — 97116 GAIT TRAINING THERAPY: CPT

## 2022-04-19 PROCEDURE — 97112 NEUROMUSCULAR REEDUCATION: CPT

## 2022-04-19 NOTE — PROGRESS NOTES
PHYSICAL THERAPY - DAILY TREATMENT NOTE    Patient Name: Leslee Gooden        Date: 2022  : 1990   YES Patient  Verified  Visit #:     Insurance: Payor: BLUE CROSS MEDICAID / Plan: [employer] PLUS / Product Type: Managed Care Medicaid /      In time: 9:31 Out time: 10:25   Total Treatment Time: 54     Medicare/BCBS [place] Time Tracking (below)   Total Timed Codes (min):  44 1:1 Treatment Time:  44     TREATMENT AREA =  Right knee pain [M25.561]  Postoperative pain of right knee [G89.18, M25.561]    SUBJECTIVE    Pain Level (on 0 to 10 scale):  1  / 10   Medication Changes/New allergies or changes in medical history, any new surgeries or procedures? NO    If yes, update Summary List   Subjective Functional Status/Changes:  []  No changes reported     Pt reports quit job at [employer] as a  to start new job tonight with more hours.      Pt c/o left knee pain, states it's been hurting like with getting up from the floor, states it's \"crackling\"         OBJECTIVE    Modalities Rationale: decrease edema, decrease inflammation and decrease pain to improve patient's ability to perform ambulation and sit to stand    min [] Estim, type/location:                                      []  att     []  unatt     []  w/US     []  w/ice    []  w/heat    min []  Mechanical Traction: type/lbs                   []  pro   []  sup   []  int   []  cont    []  before manual    []  after manual    min []  Ultrasound, settings/location:      min []  Iontophoresis w/ dexamethasone, location:                                               []  take home patch       []  in clinic   10 min [x]  Ice     []  Heat    location/position: CP to right knee, Patient supine with LE wedge    min []  Vasopneumatic Device, press/temp:     min []  Other:    [x] Skin assessment post-treatment (if applicable):    [x]  intact    [x]  redness- no adverse reaction     []redness  adverse reaction: Therapeutic Procedures:  Min Procedure Specifics + Rationale   19 [x] Therapeutic Exercise   [x]  See Flowsheet   Rationale: increase ROM and increase strength to improve the patients ability to participate in ADL's    10 [x] Neuromuscular Re-ed   [x]  See Flowsheet    Rationale: increase ROM, increase strength, improve coordination, improve balance and increase proprioception  to improve the patients ability to safely participate in ADL's   15 [x]  Gait Training   Rationale: Normalize gait, increase proprioceptive and kinesthetic awareness, coordination, balance          min Patient Education:  YES  Reviewed HEP   []  Progressed/Changed HEP based on:   Advised pt her insurance approved 6 additional visits at this time. Other Objective/Functional Measures:    No TTP right or left medial joint line (knee). Noted edema above and below right patella. Pt with improved tolerance to RDL, mod to max VCing for form. Pt fatigued with stand HS curl. Added standing star tap, pt reports minimal fatigue in glutes, but c/o soreness B patella. Post Treatment Pain Level (on 0 to 10) scale:   1  / 10     ASSESSMENT    Assessment/Changes in Function:     Pt with new symptom of left knee pain with ADL's and therapeutic activity. []  See Progress Note/Recertification   Patient will continue to benefit from skilled PT services to modify and progress therapeutic interventions, address functional mobility deficits, address ROM deficits, address strength deficits, analyze and address soft tissue restrictions, analyze and cue movement patterns, assess and modify postural abnormalities, and instruct in home and community integration to attain remaining goals. Progress toward goals / Updated goals:    1. Patient will report ability to stand for 6 hour work shift with <  3/10 right knee pain to indicate increased activity tolerance. started new job with more hours   2.  Patient will increase right knee extension strength to > 4+/5 to facilitate climbing and squatting activity. 3. Patient will demo right knee extension to 0 to facilitate terminal knee extension with stance and gait.   added standing TKE     PLAN    []  Upgrade activities as tolerated YES Continue plan of care   []  Discharge due to :    []  Other:      Therapist: Neena Swift PTA    Date: 4/19/2022 Time: 9:38 AM     Future Appointments   Date Time Provider Elizabeth Gage   4/21/2022  9:30 AM Jackie Guillen PT [facility]   4/26/2022  9:30 AM Jackie Guillen PT [facility]   4/28/2022  9:30 AM Jackie Guillen PT [facility]

## 2022-04-21 ENCOUNTER — APPOINTMENT (OUTPATIENT)
Dept: PHYSICAL THERAPY | Age: 32
End: 2022-04-21
Payer: MEDICAID

## 2022-04-26 ENCOUNTER — HOSPITAL ENCOUNTER (OUTPATIENT)
Dept: PHYSICAL THERAPY | Age: 32
Discharge: HOME | End: 2022-04-26
Payer: MEDICAID

## 2022-04-26 PROCEDURE — 97110 THERAPEUTIC EXERCISES: CPT

## 2022-04-26 PROCEDURE — 97112 NEUROMUSCULAR REEDUCATION: CPT

## 2022-04-26 PROCEDURE — 97116 GAIT TRAINING THERAPY: CPT

## 2022-04-26 PROCEDURE — 97530 THERAPEUTIC ACTIVITIES: CPT

## 2022-04-26 NOTE — PROGRESS NOTES
PHYSICAL THERAPY - DAILY TREATMENT NOTE    Patient Name: Abraham Owens        Date: 2022  : 1990   YES Patient  Verified  Visit #:     Insurance: Payor: BLUE CROSS MEDICAID / Plan: [facility] HEALTHKEEPERS PLUS / Product Type: Managed Care Medicaid /      In time: 9:23 Out time: 10:16   Total Treatment Time: 53     Medicare/BCBS Time Tracking (below)   Total Timed Codes (min):  53 1:1 Treatment Time:  53     TREATMENT AREA = Right knee pain [M25.561]  Postoperative pain of right knee [G89.18, M25.561]    SUBJECTIVE    Pain Level (on 0 to 10 scale):  0  / 10   Medication Changes/New allergies or changes in medical history, any new surgeries or procedures? NO    If yes, update Summary List   Subjective Functional Status/Changes:  []  No changes reported     \"I tried jogging for about 20 seconds the other day. It didn't feel right. \"          OBJECTIVE     Therapeutic Procedures:  Min Procedure Specifics + Rationale   n/a [x]  Patient Education (performed throughout session) [x] Review HEP    [] Progressed/Changed HEP based on:   [] proper performance and advancement of Therex/TA   [] reduction in pain level    [] increased functional capacity       [] change in directional preference   15 [x] Therapeutic Exercise   [x]  See Flowsheet   Rationale: increase ROM and increase strength to improve the patients ability to participate in ADL's    15 [x] Therapeutic Activity   [x]  See Flowsheet  Mace Switch Squat  Mace A Frame Lunge  Leg PressRationale:  To improve safety, proprioception, coordination, and efficiency with tasks   15 [x] Neuromuscular Re-ed   [x]  See Flowsheet  BOSU bottom side up mini squat    Rationale: increase ROM, increase strength, improve coordination, improve balance and increase proprioception  to improve the patients ability to safely participate in ADL's     8 [x]  Gait Training /bkag  Rationale: Normalize gait, increase proprioceptive and kinesthetic awareness, coordination, balance       Modality rationale: decrease inflammation, decrease pain, increase tissue extensibility and increase muscle contraction/control to improve the patients ability to perform ADL's with greater ease     Min Type Additional Details   [x] Skin assessment post-treatment:  [x]intact [x]redness- no adverse reaction       []redness  adverse reaction:       Other Objective/Functional Measures:    Performed SS with knees bent and knees straight. Post Treatment Pain Level (on 0 to 10) scale:   0  / 10     ASSESSMENT    Assessment/Changes in Function:       Notable challenge to proprioception and kinesthetic awareness while on BOSU. Patient will continue to benefit from skilled PT services to modify and progress therapeutic interventions, address functional mobility deficits, address ROM deficits, address strength deficits, analyze and address soft tissue restrictions, analyze and cue movement patterns, analyze and modify body mechanics/ergonomics and instruct in home and community integration  to attain remaining goals   Progress toward goals / Updated goals:    Good progress in strength for functionality. PLAN    [x]  Upgrade activities as tolerated  [x]  Update interventions per flow sheet YES Continue plan of care   []  Discharge due to :    []  Other:      Therapist: Yasmine Rao \"BJ\" JAVON Herrera, Cert. MDT, Cert. DN, Cert. SMT, Dip.  Osteopractic    Date: 4/26/2022 Time: 9:38 AM     Future Appointments   Date Time Provider Elizabeth Gage   4/28/2022  9:30 AM Ever Morrow, PT [facility]

## 2022-04-28 ENCOUNTER — HOSPITAL ENCOUNTER (OUTPATIENT)
Dept: PHYSICAL THERAPY | Age: 32
Discharge: HOME | End: 2022-04-28
Payer: MEDICAID

## 2022-04-28 PROCEDURE — 97530 THERAPEUTIC ACTIVITIES: CPT

## 2022-04-28 PROCEDURE — 97116 GAIT TRAINING THERAPY: CPT

## 2022-04-28 PROCEDURE — 97112 NEUROMUSCULAR REEDUCATION: CPT

## 2022-04-28 PROCEDURE — 97110 THERAPEUTIC EXERCISES: CPT

## 2022-04-28 NOTE — PROGRESS NOTES
PHYSICAL THERAPY - DAILY TREATMENT NOTE    Patient Name: Ariel Bailey        Date: 2022  : 1990   YES Patient  Verified  Visit #:   18      21  Insurance: Payor: BLUE CROSS MEDICAID / Plan: [facility] HEALTHKEEPERS PLUS / Product Type: Managed Care Medicaid /      In time: 9:15 Out time: 10:18   Total Treatment Time: 63     Medicare/BCBS Time Tracking (below)   Total Timed Codes (min):  53 1:1 Treatment Time:  53     TREATMENT AREA = Right knee pain [M25.561]  Postoperative pain of right knee [G89.18, M25.561]    SUBJECTIVE    Pain Level (on 0 to 10 scale):  0  / 10   Medication Changes/New allergies or changes in medical history, any new surgeries or procedures? NO    If yes, update Summary List   Subjective Functional Status/Changes:  []  No changes reported     \"KNee is fine. My shoulder is hurting for some unrelated reason\"          OBJECTIVE     Therapeutic Procedures:  Min Procedure Specifics + Rationale   n/a [x]  Patient Education (performed throughout session) [x] Review HEP    [] Progressed/Changed HEP based on:   [] proper performance and advancement of Therex/TA   [] reduction in pain level    [] increased functional capacity       [] change in directional preference   15 [x] Therapeutic Exercise   [x]  See Flowsheet   Rationale: increase ROM and increase strength to improve the patients ability to participate in ADL's    15 [x] Therapeutic Activity   [x]  See Flowsheet  KB SLRDL, Goblet Squat  Rationale:  To improve safety, proprioception, coordination, and efficiency with tasks   15 [x] Neuromuscular Re-ed   [x]  See Flowsheet  BOSU MIP, Squats, Step to Lunge, Step Up with HK  Rationale: increase ROM, increase strength, improve coordination, improve balance and increase proprioception  to improve the patients ability to safely participate in ADL's     8 [x]  Gait Training Agility Ladder: Zig Zag, mini-zigzag, in/out sidestep, in/out forward, sidestepping, HK, lateral cammy cammy.   Rationale: Normalize gait, increase proprioceptive and kinesthetic awareness, coordination, balance     Modality rationale: decrease inflammation, decrease pain, increase tissue extensibility and increase muscle contraction/control to improve the patients ability to perform ADL's with greater ease     Min Type Additional Details   10 [x]  Cold Pack   [] pre-ROMÁN      [x] post-ROMÁN  []  Ice massage Location:knee    [] supine             [] prone  [] legs elevated  [] legs flat  [] sitting              [] sidelying - [] left [] right   [x] Skin assessment post-treatment:  [x]intact [x]redness- no adverse reaction       []redness  adverse reaction:       Other Objective/Functional Measures: Added and advanced therex per flow sheet. Post Treatment Pain Level (on 0 to 10) scale:   0  / 10     ASSESSMENT    Assessment/Changes in Function:       Improved balance on BOSU         Patient will continue to benefit from skilled PT services to modify and progress therapeutic interventions, address functional mobility deficits, address ROM deficits, address strength deficits, analyze and cue movement patterns, analyze and modify body mechanics/ergonomics, assess and modify postural abnormalities and instruct in home and community integration  to attain remaining goals   Progress toward goals / Updated goals:    Good progress in functional strength     PLAN    [x]  Upgrade activities as tolerated  [x]  Update interventions per flow sheet YES Continue plan of care   []  Discharge due to :    []  Other:      Therapist: Patricia Crump \"BJ\" MARJORIE LylesT, Cert. MDT, Cert. DN, Cert. SMT, Dip.  Osteopractic    Date: 4/28/2022 Time: 9:20 AM     Future Appointments   Date Time Provider Elizabeth Gage   4/28/2022  9:30 AM Jordan Aparicio PT [facility]

## 2022-05-04 ENCOUNTER — HOSPITAL ENCOUNTER (OUTPATIENT)
Dept: PHYSICAL THERAPY | Age: 32
Discharge: HOME | End: 2022-05-04
Payer: MEDICAID

## 2022-05-04 PROCEDURE — 97112 NEUROMUSCULAR REEDUCATION: CPT

## 2022-05-04 PROCEDURE — 97116 GAIT TRAINING THERAPY: CPT

## 2022-05-04 PROCEDURE — 97110 THERAPEUTIC EXERCISES: CPT

## 2022-05-04 PROCEDURE — 97530 THERAPEUTIC ACTIVITIES: CPT

## 2022-05-04 NOTE — PROGRESS NOTES
PHYSICAL THERAPY - DAILY TREATMENT NOTE    Patient Name: Lani Mabry        Date: 2022  : 1990   YES Patient  Verified  Visit #:     Insurance: Payor: BLUE CROSS MEDICAID / Plan: [facility] HEALTHKEEPERS PLUS / Product Type: Managed Care Medicaid /      In time: 10:40early Out time: 11:33   Total Treatment Time: 53     Medicare/BCBS Time Tracking (below)   Total Timed Codes (min):  53 1:1 Treatment Time:  53     TREATMENT AREA = Right knee pain [M25.561]  Postoperative pain of right knee [G89.18, M25.561]    SUBJECTIVE    Pain Level (on 0 to 10 scale):  0  / 10   Medication Changes/New allergies or changes in medical history, any new surgeries or procedures? NO    If yes, update Summary List   Subjective Functional Status/Changes:  []  No changes reported     \"IT's not so bad. Shoulder still hurts some days, other days it's fine. Neck hurts too\"          OBJECTIVE     Therapeutic Procedures:  Min Procedure Specifics + Rationale   n/a [x]  Patient Education (performed throughout session) [x] Review HEP    [] Progressed/Changed HEP based on:   [] proper performance and advancement of Therex/TA   [] reduction in pain level    [] increased functional capacity       [] change in directional preference   15 [x] Therapeutic Exercise   [x]  See Flowsheet   Rationale: increase ROM and increase strength to improve the patients ability to participate in ADL's    8 [x]  Gait Training       HK/Retro/SS  Rationale: Normalize gait, increase proprioceptive and kinesthetic awareness, coordination, balance   15 [x] Therapeutic Activity   [x]  See Flowsheet    Rationale:  To improve safety, proprioception, coordination, and efficiency with tasks   15 [x] Neuromuscular Re-ed   [x]  See Flowsheet    Rationale: increase ROM, increase strength, improve coordination, improve balance and increase proprioception  to improve the patients ability to safely participate in ADL's       [x] Skin assessment post-treatment:  [x]intact [x]redness- no adverse reaction       []redness  adverse reaction:     Other Objective/Functional Measures:    Assessed shoulder and determined to have cervical component as patient able to reproduce and centralize symptoms to shoulder with repeated movements. Post Treatment Pain Level (on 0 to 10) scale:   0  / 10     ASSESSMENT    Assessment/Changes in Function:       Performed/participated in treatment well without complaints aside from muscular fatigue/deconditioning, indicating (+) response to current course of treatment to further improve functional status. Patient will continue to benefit from skilled PT services to instruct in home and community integration  to attain remaining goals   Progress toward goals / Updated goals:    Progressing to DC in 2 sessions     PLAN    [x]  Upgrade activities as tolerated  [x]  Update interventions per flow sheet YES Continue plan of care   []  Discharge due to :    []  Other:      Therapist: Sarah De La O \"BJ\" Elías Marsh, DPT, Cert. MDT, Cert. DN, Cert. SMT, Dip.  Osteopractic    Date: 5/4/2022 Time: 11:03 AM     Future Appointments   Date Time Provider Elizabeth Gage   5/11/2022  1:15 PM Noemi Sinclair PT [facility]   5/18/2022 11:45 AM Noemi Sinclair PT [facility]

## 2022-05-11 ENCOUNTER — APPOINTMENT (OUTPATIENT)
Dept: PHYSICAL THERAPY | Age: 32
End: 2022-05-11
Payer: MEDICAID

## 2022-05-18 ENCOUNTER — HOSPITAL ENCOUNTER (OUTPATIENT)
Dept: PHYSICAL THERAPY | Age: 32
Discharge: HOME | End: 2022-05-18
Payer: MEDICAID

## 2022-05-18 PROCEDURE — 97112 NEUROMUSCULAR REEDUCATION: CPT

## 2022-05-18 PROCEDURE — 97110 THERAPEUTIC EXERCISES: CPT

## 2022-05-18 PROCEDURE — 97530 THERAPEUTIC ACTIVITIES: CPT

## 2022-05-18 PROCEDURE — 97116 GAIT TRAINING THERAPY: CPT

## 2022-05-18 NOTE — PROGRESS NOTES
PHYSICAL THERAPY - DAILY TREATMENT NOTE    Patient Name: Olive Tobar        Date: 2022  : 1990   YES Patient  Verified  Visit #:   20   of   20  Insurance: Payor: BLUE CROSS MEDICAID / Plan: [facility] HEALTHKEEPERS PLUS / Product Type: Managed Care Medicaid /      In time: 11:38 Out time: 12:31   Total Treatment Time: 53     Medicare/BCBS Time Tracking (below)   Total Timed Codes (min):  53 1:1 Treatment Time:  53     TREATMENT AREA = Right knee pain [M25.561]  Postoperative pain of right knee [G89.18, M25.561]    SUBJECTIVE    Pain Level (on 0 to 10 scale):  0  / 10   Medication Changes/New allergies or changes in medical history, any new surgeries or procedures? NO    If yes, update Summary List   Subjective Functional Status/Changes:  []  No changes reported     \"No knee pain, but my shoulder has been bothering me. I don't know when I'm going to be able to do my exercises at home because I have 2 jobs. \"          OBJECTIVE     Therapeutic Procedures:  Min Procedure Specifics + Rationale   n/a [x]  Patient Education (performed throughout session) [x] Review HEP    [] Progressed/Changed HEP based on:   [] proper performance and advancement of Therex/TA   [] reduction in pain level    [] increased functional capacity       [] change in directional preference   15 [x] Therapeutic Exercise   [x]  See Flowsheet   Rationale: increase ROM and increase strength to improve the patients ability to participate in ADL's    10 [x]  Gait Training       Agility ladder: Zig zag, marches, in/out sidestep, in/out forward, side stepping  Rationale: Normalize gait, increase proprioceptive and kinesthetic awareness, coordination, balance   14 [x] Therapeutic Activity   [x]  See Flowsheet  Re-assessment  Rationale:  To improve safety, proprioception, coordination, and efficiency with tasks   14 [x] Neuromuscular Re-ed   [x]  See Flowsheet    Rationale: increase ROM, increase strength, improve coordination, improve balance and increase proprioception  to improve the patients ability to safely participate in ADL's           Other Objective/Functional Measures:    See DC     Post Treatment Pain Level (on 0 to 10) scale:   0  / 10     ASSESSMENT    Assessment/Changes in Function:       See DC          Patient will continue to benefit from skilled PT services to n/a  to attain remaining goals   Progress toward goals / Updated goals:    See DC      PLAN    [x]  Upgrade activities as tolerated  [x]  Update interventions per flow sheet NO Continue plan of care   []  Discharge due to :    []  Other:      Therapist: Pete Meeks, SPT  Pascual \"RENATA\" Sandie Angel, DPT, Cert. MDT, Cert. DN, Cert. SMT, Dip.  Osteopractic    Date: 5/18/2022 Time: 11:41 AM     Future Appointments   Date Time Provider Elizabeth Gage   5/18/2022 11:45 AM Katelyn Vega, PT [facility]

## 2022-05-18 NOTE — PROGRESS NOTES
[facility] PHYSICAL THERAPY  [address] [place] [address] Malu Zavala, [address] [employer] [address] - Phone: [phone]  Fax: [phone] SUMMARY  Patient Name: Brown Negron : 1990   Treatment/Medical Diagnosis: Right knee pain [M25.561]  Postoperative pain of right knee [G89.18, M25.561]   Referral Source: Sonja Aj DO     Date of Initial Visit: 22 Attended Visits: 20 Missed Visits: 1     SUMMARY OF TREATMENT  Patient's POC has consisted of therex, therapeutic activities, manual therapy prn, modalities prn, pt. education, and a comprehensive HEP. Treatment strategies used to address functional mobility deficits, ROM deficits, strength deficits, analyze and address soft tissue restrictions, analyze and cue movement patterns, analyze and modify body mechanics/ergonomics, assess and modify postural abnormalities and instruct in home and community integration. CURRENT STATUS  Patient demonstrates right knee AROM = 0-137 degrees. She is able to ambulate on even/uneven ground without safety concerns, is able to perform a full squat and lunge, and has restored gross LE strength to 5/5. She is able to perform SLR without lag, and is now working 2 jobs for a total of 13 hour shifts without increased pain. She has resumed rock climbing (but not bouldering). GOALS/MEASURE OF PROGRESS Goal Met? 1. Patient will report ability to stand for 6 hour work shift with <  3/10 right knee pain to indicate increased activity tolerance. 2. Patient will increase right knee extension strength to > 4+/5 to facilitate climbing and squatting activity. 3. Patient will demo right knee extension to 0 to facilitate terminal knee extension with stance and gait.      MET      MET      MET     RECOMMENDATIONS  Discontinue therapy. Progressing towards or have reached established goals. If you have any questions/comments please contact us directly at [phone].    Thank you for allowing us to assist in the care of your patient. Therapist Signature: Jillian Mile \"BJ\" Isamar Baron DPT, Cert. MDT, Cert. DN, Cert. SMT, Dip. Osteopractic Date: 5/18/22   Reporting Period: n/a     Certification Period n/a Time: 11:49 AM     NOTE TO PHYSICIAN:  PLEASE COMPLETE THE ORDERS BELOW AND FAX TO   [facility]: [phone]  If you are unable to process this request in 24 hours please contact our office: [phone]    ___ I have read the above report and request that my patient continue as recommended.   ___ I have read the above report and request that my patient continue therapy with the following changes/special instructions:_________________________________________________________   ___ I have read the above report and request that my patient be discharged from therapy.      Physician Signature:        Date:     Time:

## 2023-01-11 ENCOUNTER — HOSPITAL ENCOUNTER (OUTPATIENT)
Dept: PHYSICAL THERAPY | Age: 33
Discharge: HOME | End: 2023-01-11
Payer: MEDICAID

## 2023-01-11 PROCEDURE — 97162 PT EVAL MOD COMPLEX 30 MIN: CPT

## 2023-01-11 NOTE — THERAPY EVALUATION
[address] [place] [address] PHYSICAL THERAPY  [address] [facility] Kolby Zavala, [address] [employer] [address] - Phone: [phone]  Fax: [phone] / 0274 [facility]  Patient Name: Morteza Stewart : 1990   Medical   Diagnosis: Pain in right knee [M25.561] Treatment Diagnosis: Pain in right knee [M25.561]   Onset Date: 2022     Referral Source:  Start of Care [facility]): 2023   Prior Hospitalization: See medical history Provider #: [id number]   Prior Level of Function: Independent with ADLs, ambulation; working    Comorbidities: right ACL reconstruction x 2, right knee meniscus repair, arthritis, prior PT   Medications: Verified on Patient Summary List   The Plan of Care and following information is based on the information from the initial evaluation.   ===========================================================================================  Assessment / key information:  Patient is a 29 y.o. female who presents with complaints of right knee pain and giving way, which has been present since she underwent right knee meniscus repair 2022, but became worse after a hike 2022. Patient demonstrates mild deficits in right hip abductor/B hip extensor strength and flexibility of right hip/thigh musculature; otherwise, exam unremarkable. Patient would benefit from trial of skilled PT services to address these issues and improve function. If no improvement, will refer back to physician.   Thank you for this referral.    ROM / Strength                      AROM                      PROM                   Strength (1-5)      Left Right Left Right Left Right   Hip Flexion [facility] WFL     5 5     Extension [facility] WFL     4 4     Abduction [facility] WFL     5 4     Adduction [facility] WFL     NT NT   Knee Flexion 145 140     5 5     Extension 0 0     5 5   Ankle Plantarflexion WFL WFL     5 5     [facility]     5 5      Flexibility: Hamstrings:               (L) Tightness= [x] WNL   [] Min   [] Mod   [] Severe                        (R) Tightness= [] WNL   [x] Min   [] Mod   [] Severe  Quadriceps:               (L) Tightness= [x] WNL   [] Min   [] Mod   [] Severe                        (R) Tightness= [] WNL   [x] Min   [] Mod   [] Severe  Gastroc:                 (L) Tightness= [x] WNL   [] Min   [] Mod   [] Severe                        (R) Tightness= [x] WNL   [] Min   [] Mod   [] Severe    Optional Tests:  Posterior Drawer         [x] Neg    [] Pos  Valgus@ 0 Degrees    [x] Neg    [] Pos          Valgus@ 30 Degrees  [x] Neg    [] Pos            Varus@ 0 Degrees     [x] Neg    [] Pos            Varus@ 30 Degrees   [x] Neg    [] Pos            Anterior Drawer           [x] Neg    [] Pos             FOTO: 69/100, stage 4, advanced ambulation  ==========================================================================================  Eval Complexity: History: HIGH Complexity :3+ comorbidities / personal factors will impact the outcome/ POC Exam:HIGH Complexity : 4+ Standardized tests and measures addressing body structure, function, activity limitation and / or participation in recreation  Presentation: HIGH Complexity : Unstable and unpredictable characteristics  Clinical Decision Making:Other outcome measures FOTO complexity level  MEDIUMOverall Complexity:MEDIUM    Problem List: pain affecting function, decrease ROM, decrease strength, decrease ADL/ functional abilitiies, decrease activity tolerance, and decrease flexibility/ joint mobility   Treatment Plan may include any combination of the following: Therapeutic exercise, Neuromuscular reeducation, Manual therapy, Therapeutic activity, Self care/home management, Electric stim unattended , Vasopneumatic device, Gait training, Ultrasound, and Electric stim attended  Patient / Family readiness to learn indicated by: asking questions, trying to perform skills, and interest  Persons(s) to be included in education: patient (P)  Barriers to Learning/Limitations: None  Measures taken:    Patient Goal (s): \"To show that my knee needs another MRI to see if something actually did tear a few months ago during hike. \"   Patient self reported health status: good  Rehabilitation Potential: fair  Short Term Goals: To be accomplished in  2  weeks:  Patient will demonstrate compliance with HEP. Long Term Goals: To be accomplished in  4  weeks:  Patient will demonstrate independence with HEP. Patient will demonstrate 5/5 right hip abduction strength to facilitate gait stability. Patient will report decrease in frequency of episodes of right knee giving way by 50% to allow improved activity tolerance. Frequency / Duration:   Patient to be seen  2-3  times per week for 4  weeks:  Patient / Caregiver education and instruction: other HEP review    Therapist Signature: Eddy Douglas PT Date: 3/51/7988   Certification Period: NA Time: 9:35 AM   ===========================================================================================    To ensure your patient receives the highest quality care and to avoid disruption in therapy please sign and return this plan of care within 21 days. Per Medicaid guidelines if the plan of care is not received within 21 days the patient's care must be put on hold until signed. Per Medicaid guidelines, plan of care must be signed by physician. I certify that the above Physical Therapy Services are being furnished while the patient is under my care. I agree with the treatment plan and certify that this therapy is necessary. Physician Signature:        Date:       Time:     Rena Stone, DO  Please sign and return to [employer] or you may fax the signed copy to [phone]. Thank you.

## 2023-01-11 NOTE — PROGRESS NOTES
PHYSICAL THERAPY - DAILY TREATMENT NOTE    Patient Name: Jace Mario        Date: 2023  : 1990   YES Patient  Verified  Visit #:     Insurance: Payor: BLUE CROSS MEDICAID / Plan: [address] [place] [address] / Product Type: Managed Care Medicaid /      In time: 9:32 Out time: 10:01   Total Treatment Time: 29     Medicare/BCBS [place] Time Tracking (below)   Total Timed Codes (min):  NA 1:1 Treatment Time:  NA     TREATMENT AREA =  Right knee    SUBJECTIVE    Pain Level (on 0 to 10 scale):  0  / 10   Medication Changes/New allergies or changes in medical history, any new surgeries or procedures? NO    If yes, update Summary List   Subjective Functional Status/Changes:  []  No changes reported     Pt reports that her right knee is giving way, reports this has been occurring since surgery but has been happening more frequently since she was hiking in [facility] 3 months ago. Pt reports that her knee gives way multiple times daily. Pt reports that if she exercises (performs the exercises prescribed in PT), her knee gives way more frequently and she has increased right knee pain. Pt reports that prone knee flexion, squats, lunges increase her pain while she is performing them. Pt reports that standing at work increases her knee pain and increases the frequency of her knee giving way. Pt reports that she has a brace, but it does not help. Pt reports that she only does clam, bridge and SLR 3-way, reports that she stopped stand hip 3-way because it seems to make symptoms worse. OBJECTIVE    Physical Therapy Evaluation - Knee    Gait:  [x] Normal    [] Abnormal    [] Antalgic    [] NWB    Device: None    Describe:  WNL    ROM / Strength  [] Unable to assess                  AROM                      PROM                   Strength (1-5)    Left Right Left Right Left Right   Hip Flexion [facility] WFL   5 5    Extension [facility] WFL   4 4    Abduction [facility] WFL   5 4    Adduction [facility] NT NT   Knee Flexion 145 140   5 5    Extension 0 0   5 5   Ankle Plantarflexion [facility] WFL   5 5    Dorsiflexion [facility] WFL   5 5     Flexibility: [] Unable to assess at this time  Hamstrings:    (L) Tightness= [x] WNL   [] Min   [] Mod   [] Severe    (R) Tightness= [] WNL   [x] Min   [] Mod   [] Severe  Quadriceps:    (L) Tightness= [x] WNL   [] Min   [] Mod   [] Severe    (R) Tightness= [] WNL   [x] Min   [] Mod   [] Severe  Gastroc:      (L) Tightness= [x] WNL   [] Min   [] Mod   [] Severe    (R) Tightness= [x] WNL   [] Min   [] Mod   [] Severe  Other:    Palpation:   Neg/Pos  Neg/Pos  Neg/Pos   Joint Line neg Quad tendon neg Patellar ligament neg   Patella neg Fibular head neg Pes Anserinus neg   Tibial tubercle neg Hamstring tendons neg Infrapatellar fat pad neg     Optional Tests:  Patellar Mobility   [x]L [x]R Hypermobile []L []R Hypomobile         Lachmans  [] Neg    [] Pos Posterior Drawer [x] Neg    [] Pos  Pivot Shift  [] Neg    [] Pos Posterior Sag  [] Neg    [] Pos  CHACE   [] Neg    [] Pos Elizabeth's Test [] Neg    [] Pos  ALRI   [] Neg    [] Pos Squat   [] Neg    [] Pos  Valgus@ 0 Degrees [x] Neg    [] Pos Izzy [] Neg    [] Pos  Valgus@ 30 Degrees [x] Neg    [] Pos Patellar Apprehension [] Neg    [] Pos  Varus@ 0 Degrees [x] Neg    [] Pos Zelaya's Compression [] Neg    [] Pos  Varus@ 30 Degrees [x] Neg    [] Pos Ely's Test  [] Neg    [] Pos  Apley's Compression [] Neg    [] Pos Clifton's Test  [] Neg    [] Pos  Apley's Distraction [] Neg    [] Pos Stroke Test  [] Neg    [] Pos   Anterior Drawer [x] Neg    [] Pos Fluctuation Test [] Neg    [] Pos  Other:                  [] Neg    [] Pos                 Other tests/comments:    T/o eval min Patient Education:  YES  Reviewed HEP   []  Progressed/Changed HEP based on:   Reviewed prior HEP; advised to hold all prior HEP exercises except SLR 3-way and assess effect     Other Objective/Functional Measures:    See eval         Post Treatment Pain Level (on 0 to 10) scale:   0  / 10     ASSESSMENT    Assessment/Changes in Function:     See plan of care  Justification for Eval Code Complexity:  Patient History : HIGH - right ACL reconstruction x 2, right knee meniscus repair, arthritis, prior PT  Examination HIGH - see objective  Clinical Presentation: HIGH  Clinical Decision Making : MEDIUM - FOTO complexity level     []  See Progress Note/Recertification   Patient will continue to benefit from skilled PT services: see plan of care   Progress toward goals / Updated goals:    See plan of care     PLAN    [x]  Upgrade activities as tolerated YES Continue plan of care   []  Discharge due to :    []  Other:      Therapist: Michael Villalobos PT    Date: 1/11/2023 Time: 9:35 AM

## 2023-02-02 ENCOUNTER — APPOINTMENT (OUTPATIENT)
Dept: PHYSICAL THERAPY | Age: 33
End: 2023-02-02
Payer: MEDICAID

## 2023-02-06 ENCOUNTER — HOSPITAL ENCOUNTER (OUTPATIENT)
Dept: PHYSICAL THERAPY | Age: 33
Discharge: HOME | End: 2023-02-06
Payer: MEDICAID

## 2023-02-06 PROCEDURE — 97530 THERAPEUTIC ACTIVITIES: CPT

## 2023-02-06 PROCEDURE — 97110 THERAPEUTIC EXERCISES: CPT

## 2023-02-06 NOTE — PROGRESS NOTES
PHYSICAL THERAPY - DAILY TREATMENT NOTE    Patient Name: Lazarus Durham        Date: 2023  : 1990   yes Patient  Verified  Visit #:   2     Insurance: Payor: BLUE CROSS MEDICAID / Plan: [address] [place] [address] / Product Type: Managed Care Medicaid /      In time: 449 Out time: 087   Total Treatment Time: 47     Medicare/BCBS Time Tracking (below)   Total Timed Codes (min):  37 1:1 Treatment Time:  35     TREATMENT AREA =  Pain in right knee [M25.561]    SUBJECTIVE  Pain Level (on 0 to 10 scale):  0  / 10   Medication Changes/New allergies or changes in medical history, any new surgeries or procedures?    no  If yes, update Summary List   Subjective Functional Status/Changes:  []  No changes reported     \"It  starts to hurt sometimes, and it gives out while I am walking at work. I am not in constant pain\"         OBJECTIVE  Modalities Rationale:     decrease inflammation and decrease pain to improve patient's ability to  safely perform ADLs/transfers/squatting/prolong stding and ambulation/stair negotiation with greater ease    10 min [x]  Ice     []  Heat    location/position: Supine with wedge under B LEs   [x]Skin assessment post-treatment (if applicable):   []  intact    []  redness- no adverse reaction                  []redness - adverse reaction:      17 min Therapeutic Exercise:  [x]  See flow sheet   Rationale:      increase ROM, increase strength, and improve coordination to improve the patients ability to  safely perform ADLs/transfers/squatting/prolong stding and ambulation/stair negotiation with greater ease      10 min Therapeutic Activity: [x]  See flow sheet   Rationale:    increase ROM, increase strength, improve coordination, and improve balance to improve the patients ability to  safely perform ADLs/transfers/squatting/prolong stding and ambulation/stair negotiation with greater ease      10 min Gait Training:  sonali browned/retro/lateral   Rationale:  To improve ambulation safety and efficiency in order to improve patient's ability to safely ambulate at home for self care. Billed With/As:   [x] TE   [x] TA   [] Neuro   [] Self Care Patient Education: [x] Review HEP    [] Progressed/Changed HEP based on:   [x] positioning   [x] body mechanics   [x] transfers   [] heat/ice application    [x] other: Pt ed on importance and benefits of compliance with HEP, core strength/stability and proper posture; pt verbalized understanding  issued BTB for HEP       Other Objective/Functional Measures:    VCs + demo to perform proper technique and for safety with TE    Initiated TE per flowsheet;  no c/o pain noted at this time    demos SLR without ext lag    performed step ups fwd/lateral on 6'' step x 10 with no UE assist   Post Treatment Pain Level (on 0 to 10) scale:   0  / 10     ASSESSMENT  Assessment/Changes in Function:     demos good quad control with stair training  Progressed TE without c/o increase p! []  See Progress Note/Recertification   Patient will continue to benefit from skilled PT services to modify and progress therapeutic interventions, address functional mobility deficits, address ROM deficits, address strength deficits, analyze and address soft tissue restrictions, analyze and cue movement patterns, analyze and modify body mechanics/ergonomics, assess and modify postural abnormalities, and instruct in home and community integration to attain remaining goals.    Progress toward goals / Updated goals:  Pt's first visit since [facility], no noted progress        PLAN  [x]  Upgrade activities as tolerated yes Continue plan of care   []  Discharge due to :    []  Other:      Therapist: Wagner Pires PTA    Date: 2/6/2023 Time: 8:26 AM     Future Appointments   Date Time Provider Elizabeth Gage   2/8/2023  8:00 AM Gerson Strickland PTA [facility]   2/10/2023  8:00 AM PARESH FrairePTPIOTR [facility]   2/14/2023  8:00 AM Gerson Strickland PTA [facility]   2/15/2023 8:00 AM Doug Fraga [facility]   2/17/2023  8:00 AM Kimberly Almodovar, PTA [facility]   2/20/2023  8:00 AM Kimberly Almodovar, PTA [facility]   2/22/2023  8:00 AM Kimberly Almodovar, PTA [facility]   2/24/2023  8:00 AM Kimberly Almodovar, PTA [facility]   2/27/2023  8:00 AM Kimberly Almodovar, PTA [facility]   3/1/2023  8:00 AM Jeff Montes PT [facility]

## 2023-02-08 ENCOUNTER — HOSPITAL ENCOUNTER (OUTPATIENT)
Dept: PHYSICAL THERAPY | Age: 33
Discharge: HOME | End: 2023-02-08
Payer: MEDICAID

## 2023-02-08 PROCEDURE — 97110 THERAPEUTIC EXERCISES: CPT

## 2023-02-08 PROCEDURE — 97530 THERAPEUTIC ACTIVITIES: CPT

## 2023-02-08 NOTE — PROGRESS NOTES
PHYSICAL THERAPY - DAILY TREATMENT NOTE    Patient Name: Hina Rodrigues        Date: 2023  : 1990   yes Patient  Verified  Visit #:   3     Insurance: Payor: Samantha Demarco / Plan: [employer] / Product Type: Managed Care Medicaid /      In time: 153 Out time: 433   Total Treatment Time: 45     Medicare/BCBS Time Tracking (below)   Total Timed Codes (min):  45 1:1 Treatment Time:  34     TREATMENT AREA =  Pain in right knee [M25.561]    SUBJECTIVE  Pain Level (on 0 to 10 scale):  0  / 10   Medication Changes/New allergies or changes in medical history, any new surgeries or procedures?    no  If yes, update Summary List   Subjective Functional Status/Changes:  []  No changes reported     \"It doesn't have a pattern when it gives out at work. I was just standing and it gave out yesterday. I don't know how long I had been there. The longer I am there the more it gives out.  I only have a 5 hr shift\"         OBJECTIVE  Modalities Rationale:     decrease inflammation and decrease pain to improve patient's ability to  safely perform ADLs/transfers/squatting/prolong stding and ambulation/stair negotiation with greater ease    10 min [x]  Ice     []  Heat    location/position: Supine with wedge under B LEs   [x]Skin assessment post-treatment (if applicable):   []  intact    []  redness- no adverse reaction                  []redness - adverse reaction:      25 min Therapeutic Exercise:  [x]  See flow sheet   Rationale:      increase ROM, increase strength, and improve coordination to improve the patients ability to  safely perform ADLs/transfers/squatting/prolong stding and ambulation/stair negotiation with greater ease      10 min Therapeutic Activity: [x]  See flow sheet   Rationale:    increase ROM, increase strength, improve coordination, and improve balance to improve the patients ability to  safely perform ADLs/transfers/squatting/prolong stding and ambulation/stair negotiation with greater ease    10 min Gait Training:  sonali fwd/retro/lateral   Rationale: To improve ambulation safety and efficiency in order to improve patient's ability to safely ambulate at home for self care. Billed With/As:   [x] TE   [x] TA   [] Neuro   [] Self Care Patient Education: [x] Review HEP    [] Progressed/Changed HEP based on:   [x] positioning   [x] body mechanics   [x] transfers   [] heat/ice application    [x] other: Pt ed on importance and benefits of compliance with HEP, core strength/stability and proper posture; pt verbalized understanding  issued RTB to perform monster walk for  HEP       Other Objective/Functional Measures:    VCs + demo to perform proper technique and for safety with TE    Initiated prone TKE with 2#, squat IR/ER, and prone glut kick;  no c/o pain noted at this time    VCs to decrease anterior translation with squat IR/ER to promote proper tech     Post Treatment Pain Level (on 0 to 10) scale:   0  / 10     ASSESSMENT  Assessment/Changes in Function:     demos good quad control with no UE assist with stair training    Progressed TE without c/o increase p! []  See Progress Note/Recertification   Patient will continue to benefit from skilled PT services to modify and progress therapeutic interventions, address functional mobility deficits, address ROM deficits, address strength deficits, analyze and address soft tissue restrictions, analyze and cue movement patterns, analyze and modify body mechanics/ergonomics, assess and modify postural abnormalities, and instruct in home and community integration to attain remaining goals. Progress toward goals / Updated goals:  Short Term Goals: To be accomplished in  2  weeks:  Patient will demonstrate compliance with HEP. Established HEP  Long Term Goals: To be accomplished in  4  weeks:  Patient will demonstrate independence with HEP.   Patient will demonstrate 5/5 right hip abduction strength to facilitate gait stability. addressed with clams and lateral amb with RTB  Patient will report decrease in frequency of episodes of right knee giving way by 50% to allow improved activity tolerance.        PLAN  [x]  Upgrade activities as tolerated yes Continue plan of care   []  Discharge due to :    []  Other:      Therapist: Janay Eugene PTA    Date: 2/8/2023 Time: 10:08 AM     Future Appointments   Date Time Provider Elizabeth Gage   2/14/2023  8:00 AM Bernard Carney PTA [facility]   2/15/2023  8:00 AM Marjorie [facility]   2/17/2023  8:00 AM Sirena Almodovar, PTA [facility]   2/20/2023  8:00 AM Sherri Almodovar, PTA [facility]   2/22/2023  8:00 AM Sherri Almodovar, PTA [facility]   2/24/2023  8:00 AM Radha Almodovaria, PTA [facility]   2/27/2023  8:00 AM Sherri Almodovar, PTA [facility]

## 2023-02-10 ENCOUNTER — APPOINTMENT (OUTPATIENT)
Dept: PHYSICAL THERAPY | Age: 33
End: 2023-02-10
Payer: MEDICAID

## 2023-02-14 ENCOUNTER — APPOINTMENT (OUTPATIENT)
Dept: PHYSICAL THERAPY | Age: 33
End: 2023-02-14
Payer: MEDICAID

## 2023-02-14 ENCOUNTER — HOSPITAL ENCOUNTER (OUTPATIENT)
Facility: HOSPITAL | Age: 33
Setting detail: RECURRING SERIES
Discharge: HOME | End: 2023-02-17
Payer: MEDICAID

## 2023-02-14 PROCEDURE — 97530 THERAPEUTIC ACTIVITIES: CPT

## 2023-02-14 PROCEDURE — 97112 NEUROMUSCULAR REEDUCATION: CPT

## 2023-02-14 PROCEDURE — 97110 THERAPEUTIC EXERCISES: CPT

## 2023-02-14 PROCEDURE — 97116 GAIT TRAINING THERAPY: CPT

## 2023-02-14 NOTE — PROGRESS NOTES
PHYSICAL / OCCUPATIONAL THERAPY - DAILY TREATMENT NOTE (updated )    Patient Name: Lucas Andino    Date: 2023    : 1990  Insurance: Payor: Nader Ayala / Plan: Gordo Shultz / Product Type: *No Product type* /      Patient  verified yes     Visit #   Current / Total 4 8-12   Time   In / Out 800 846   Pain   In / Out 1/10 0/10   Subjective Functional Status/Changes: \"I am really over my job and I need more money so I have an interview Thursday, I am happy. I am ok in the knee, not really any changes''   Changes to:  Meds, Allergies, Med Hx, Sx Hx? If yes, update Summary List no       TREATMENT AREA =  No admission diagnoses are documented for this encounter. OBJECTIVE        Therapeutic Procedures: Tx Min Billable or 1:1 Min (if diff from Tx Min) Procedure, Rationale, Specifics   16  74209 Therapeutic Exercise (timed):  increase ROM, strength, coordination, balance, and proprioception to improve patient's ability to progress to PLOF and address remaining functional goals. (see flow sheet as applicable)     Details if applicable:        10                                                                                                                                                                                                                                                                                            58414 Neuromuscular Re-Education (timed):  improve balance, coordination, kinesthetic sense, posture, core stability and proprioception to improve patient's ability to develop conscious control of individual muscles and awareness of position of extremities in order to progress to PLOF and address remaining functional goals.  (see flow sheet as applicable)     Details if applicable:  performed 10 reps of TKE with 2#, and 2nd set with 3# with no c/o p!   10  33555 Gait Training (timed):    (I) with monster walk x 60' fwd/retro  And 30' lateral Cuing to decrease valgus performing fwd monster. To improve safety and dynamic movement with household/community ambulation. (see flow sheet as applicable)     Details if applicable:     10  60385 Therapeutic Activity (timed):  use of dynamic activities replicating functional movements to increase ROM, strength, coordination, balance, and proprioception in order to improve patient's ability to progress to PLOF and address remaining functional goals. (see flow sheet as applicable)     Details if applicable:     55 46 [facility] Totals Reminder: bill using total billable min of TIMED therapeutic procedures (example: do not include dry needle or estim unattended, both untimed codes, in totals to left)  8-22 min = 1 unit; 23-37 min = 2 units; 38-52 min = 3 units; 53-67 min = 4 units; 68-82 min = 5 units   Total Total     [x]  Patient Education billed concurrently with other procedures   [x] Review HEP    [] Progressed/Changed HEP, detail:    [] Other detail:       Objective Information/Functional Measures/Assessment  initiated bridges with no c/o P! VCs + demo to perform proper technique and for safety with TE  No UE assist with stair training  Patient will continue to benefit from skilled PT / OT services to modify and progress therapeutic interventions, analyze and address functional mobility deficits, analyze and address ROM deficits, analyze and address strength deficits, analyze and address soft tissue restrictions, analyze and cue for proper movement patterns, analyze and modify for postural abnormalities, and instruct in home and community integration to address functional deficits and attain remaining goals. Progress toward goals / Updated goals:  []  See Progress Note/Recertification  Demos good quad control performing step ups with no UE assist  Demos fair+ bridge   (I) with GT  Short Term Goals: To be accomplished in  2  weeks:  Patient will demonstrate compliance with HEP.  Established HEP  Long Term Goals: To be accomplished in  4  weeks:  Patient will demonstrate independence with HEP. Patient will demonstrate 5/5 right hip abduction strength to facilitate gait stability. Addressed with Side stepping and Sling clams   Patient will report decrease in frequency of episodes of right knee giving way by 50% to allow improved activity tolerance.   PLAN  yes Continue plan of care  [x]  Upgrade activities as tolerated  []  Discharge due to :  []  Other:    Olena Christianson PTA    2/14/2023    8:33 AM    Future Appointments   Date Time Provider Na Castro   2/15/2023  8:00 AM Rebekah Torres PTA [facility]   2/17/2023  8:00 AM Olena Christianson PTA [facility]   2/20/2023  8:00 AM Olena Christianson PTA [facility]   2/22/2023  8:00 AM Misty Edward PT [facility]   2/27/2023  8:00 AM Tania Kim PTA [facility]

## 2023-02-15 ENCOUNTER — HOSPITAL ENCOUNTER (OUTPATIENT)
Facility: HOSPITAL | Age: 33
Setting detail: RECURRING SERIES
Discharge: HOME | End: 2023-02-18
Payer: MEDICAID

## 2023-02-15 ENCOUNTER — APPOINTMENT (OUTPATIENT)
Dept: PHYSICAL THERAPY | Age: 33
End: 2023-02-15
Payer: MEDICAID

## 2023-02-15 PROCEDURE — 97530 THERAPEUTIC ACTIVITIES: CPT

## 2023-02-15 PROCEDURE — 97112 NEUROMUSCULAR REEDUCATION: CPT

## 2023-02-15 PROCEDURE — 97110 THERAPEUTIC EXERCISES: CPT

## 2023-02-15 NOTE — PROGRESS NOTES
[facility] [place] [address]   Phone: [phone] Fax: [phone]  PROGRESS NOTE  Patient Name: Uli Roberts : 1990   Treatment/Medical Diagnosis: No admission diagnoses are documented for this encounter. Referral Source: Juan Jasso DO     Date of Initial Visit: 23 Attended Visits: 5 Missed Visits: 0     SUMMARY OF TREATMENT  Patient's treatment has consisted of LE strengthening and stabilization exercises, gait and balance training to address right knee instability and ms weakness. CURRENT STATUS  Pt progressing well with exercises in clinic and without c/o increased pain. Pt continues to report random buckling of right knee with ADL's and states frequency is still daily. Pt's main c/o is intermittent medial knee pain that ranges from 0-10/10 pain level. Pt continues with ms weakness right hip abd and extension. Pt demo's decreased right knee stability during therapeutic activity with single limb balance. MMT:  4/5 right hip abd   4+/5 left hip abd   4+5 B hip ext     Current goals:  Patient will demonstrate independence with HEP. Pt compliant with HEP, mod Vcing for form   Patient will demonstrate 5/5 right hip abduction strength to facilitate gait stability. 4/5 right hip abd  Patient will report decrease in frequency of episodes of right knee giving way by 50% to allow improved activity tolerance. No change in frequency        New Goals to be achieved in __3-4__  weeks   1. Patient will demonstrate independence with HEP. 2.  Patient will demonstrate 5/5 right hip abduction strength to facilitate gait stability. 3.  Patient will report decrease in frequency of episodes of right knee giving way by 50% to allow improved activity tolerance.      RECOMMENDATIONS  Pt would benefit from continued therapy to address right LE ms weakness and instability to improve patient's tolerance to standing and walking activity and allow pt to retrun to prior recreational activity including hiking and running. If you have any questions/comments please contact us directly at [phone]   Thank you for allowing us to assist in the care of your patient.   PTA Signature Jordan Vasquez PTA     Therapist Signature: Josefina Gibson Date: 2/15/2023   Reporting Period  NA Time: 8:30 AM

## 2023-02-15 NOTE — PROGRESS NOTES
PHYSICAL / OCCUPATIONAL THERAPY - DAILY TREATMENT NOTE (updated )    Patient Name: Kwesi Brown    Date: 2/15/2023    : 1990  Insurance: Payor: Roxanna Harper / Plan: Shirley Phi / Product Type: *No Product type* /      Patient  verified yes     Visit #   Current / Total 5 8-12   Time   In / Out 8:05 8:50   Pain   In / Out 2 0   Subjective Functional Status/Changes: Pt reports main c/o is intermittent pain inside of right. Pt reports right knee still buckling daily and feels unstable. Changes to:  Meds, Allergies, Med Hx, Sx Hx? If yes, update Summary List no       TREATMENT AREA =  R knee quad atrophy     OBJECTIVE      Therapeutic Procedures: Tx Min Billable or 1:1 Min (if diff from Tx Min) Procedure, Rationale, Specifics   15  65257 Therapeutic Exercise (timed):  increase ROM, strength, coordination, balance, and proprioception to improve patient's ability to progress to PLOF and address remaining functional goals. (see flow sheet as applicable)     Details if applicable:       15  86141 Therapeutic Activity (timed):  use of dynamic activities replicating functional movements to increase ROM, strength, coordination, balance, and proprioception in order to improve patient's ability to progress to PLOF and address remaining functional goals. (see flow sheet as applicable)     Details if applicable:     15  15267 Neuromuscular Re-Education (timed):  improve balance, coordination, kinesthetic sense, posture, core stability and proprioception to improve patient's ability to develop conscious control of individual muscles and awareness of position of extremities in order to progress to PLOF and address remaining functional goals.  (see flow sheet as applicable)     Details if applicable:     45 39 [facility] Totals Reminder: bill using total billable min of TIMED therapeutic procedures (example: do not include dry needle or estim unattended, both untimed codes, in totals to left)  8-22 min = 1 unit; 23-37 min = 2 units; 38-52 min = 3 units; 53-67 min = 4 units; 68-82 min = 5 units   Total Total     [x]  Patient Education billed concurrently with other procedures   [x] Review HEP    [] Progressed/Changed HEP, detail:    [] Other detail:       Objective Information/Functional Measures/Assessment    4/5 right hip abd   4+/5 left hip abd   4+5 B hip ext     Trial of KT: I strip to medial and lateral joint line to promote knee stabilization. Patient will continue to benefit from skilled PT / OT services to modify and progress therapeutic interventions, analyze and address functional mobility deficits, analyze and address strength deficits, analyze and cue for proper movement patterns, and instruct in home and community integration to address functional deficits and attain remaining goals. Progress toward goals / Updated goals:  [x]  See Progress Note/Recertification    Patient will demonstrate independence with HEP. Pt compliant with HEP, mod Vcing for form   Patient will demonstrate 5/5 right hip abduction strength to facilitate gait stability. 4/5 right hip abd  Patient will report decrease in frequency of episodes of right knee giving way by 50% to allow improved activity tolerance.  No change in frequency     PLAN  yes Continue plan of care  [x]  Upgrade activities as tolerated  []  Discharge due to :  []  Other:    Nicolas Bronson PTA    2/15/2023    8:17 AM    Future Appointments   Date Time Provider Na Castro   2/17/2023  8:00 AM Sandee Stanford PTA [facility]   2/20/2023  8:00 AM Sandee Stanford PTA [facility]   2/22/2023  8:00 AM Armani Michele PT [facility]   2/27/2023  8:00 AM ALEX [facility]

## 2023-02-17 ENCOUNTER — HOSPITAL ENCOUNTER (OUTPATIENT)
Facility: HOSPITAL | Age: 33
Setting detail: RECURRING SERIES
Discharge: HOME | End: 2023-02-20
Payer: MEDICAID

## 2023-02-17 ENCOUNTER — APPOINTMENT (OUTPATIENT)
Dept: PHYSICAL THERAPY | Age: 33
End: 2023-02-17
Payer: MEDICAID

## 2023-02-17 PROCEDURE — 97110 THERAPEUTIC EXERCISES: CPT

## 2023-02-17 PROCEDURE — 97112 NEUROMUSCULAR REEDUCATION: CPT

## 2023-02-17 NOTE — PROGRESS NOTES
PHYSICAL / OCCUPATIONAL THERAPY - DAILY TREATMENT NOTE (updated )    Patient Name: Meredith Marmolejo    Date: 2023    : 1990  Insurance: Payor: Surya Lala / Plan: Cherie Ag / Product Type: *No Product type* /      Patient  verified yes     Visit #   Current / Total 6 8-12   Time   In / Out 800 849   Pain   In / Out 1-2/10 0/10   Subjective Functional Status/Changes: \"I really like the tape, I want to get some. I was able to rock climb but then it gave out this morning when I was walking''   Changes to:  Meds, Allergies, Med Hx, Sx Hx? If yes, update Summary List no       TREATMENT AREA =  No admission diagnoses are documented for this encounter. OBJECTIVE        Therapeutic Procedures: Tx Min Billable or 1:1 Min (if diff from Tx Min) Procedure, Rationale, Specifics   19  79930 Therapeutic Exercise (timed):  increase ROM, strength, coordination, balance, and proprioception to improve patient's ability to progress to PLOF and address remaining functional goals. (see flow sheet as applicable)     Details if applicable:        10                                                                                                                                                                                                                                                                                           10 54496 Neuromuscular Re-Education (timed):  improve balance, coordination, kinesthetic sense, posture, core stability and proprioception to improve patient's ability to develop conscious control of individual muscles and awareness of position of extremities in order to progress to PLOF and address remaining functional goals.  (see flow sheet as applicable)     Details if applicable: : I strip to medial and lateral joint line and KT to med/lat knee with \"0\" tech  to promote knee stabilization    10 10 67594 Gait Training (timed):    (I) with monster walk x 60' fwd/retro  And 30' lateral   Cuing to decrease valgus performing fwd monster. To improve safety and dynamic movement with household/community ambulation. (see flow sheet as applicable)     Details if applicable:     10 34 11750 Therapeutic Activity (timed):  use of dynamic activities replicating functional movements to increase ROM, strength, coordination, balance, and proprioception in order to improve patient's ability to progress to PLOF and address remaining functional goals. (see flow sheet as applicable)     Details if applicable:     52 35 [facility] Totals Reminder: bill using total billable min of TIMED therapeutic procedures (example: do not include dry needle or estim unattended, both untimed codes, in totals to left)  8-22 min = 1 unit; 23-37 min = 2 units; 38-52 min = 3 units; 53-67 min = 4 units; 68-82 min = 5 units   Total Total     [x]  Patient Education billed concurrently with other procedures   [x] Review HEP    [] Progressed/Changed HEP, detail:    [] Other detail:       Objective Information/Functional Measures/Assessment  VCs + demo to perform proper technique and for safety with TE  Increased to GTB with no c/o p! Demos good bridge form with GTB  Patient will continue to benefit from skilled PT / OT services to modify and progress therapeutic interventions, analyze and address functional mobility deficits, analyze and address ROM deficits, analyze and address strength deficits, analyze and address soft tissue restrictions, analyze and cue for proper movement patterns, analyze and modify for postural abnormalities, and instruct in home and community integration to address functional deficits and attain remaining goals.     Progress toward goals / Updated goals:  []  See Progress Note/Recertification  See PN last visit, no changes noted at this time    PLAN  yes Continue plan of care  [x]  Upgrade activities as tolerated  []  Discharge due to :  []  Other:    Katie Kim, PTA    2/17/2023 10:21 AM    Future Appointments   Date Time Provider Na Castro   2/20/2023  8:00 AM Quirino Leonardo, ALEX [facility]   2/22/2023  8:00 AM Rajiv Islas PT [facility]   2/27/2023  8:00 AM Quirino Leonardo PTA [facility]

## 2023-02-20 ENCOUNTER — APPOINTMENT (OUTPATIENT)
Dept: PHYSICAL THERAPY | Age: 33
End: 2023-02-20
Payer: MEDICAID

## 2023-02-22 ENCOUNTER — HOSPITAL ENCOUNTER (OUTPATIENT)
Facility: HOSPITAL | Age: 33
Setting detail: RECURRING SERIES
Discharge: HOME | End: 2023-02-25
Payer: MEDICAID

## 2023-02-22 ENCOUNTER — APPOINTMENT (OUTPATIENT)
Dept: PHYSICAL THERAPY | Age: 33
End: 2023-02-22
Payer: MEDICAID

## 2023-02-22 PROCEDURE — 97110 THERAPEUTIC EXERCISES: CPT

## 2023-02-22 PROCEDURE — 97530 THERAPEUTIC ACTIVITIES: CPT

## 2023-02-22 PROCEDURE — 97112 NEUROMUSCULAR REEDUCATION: CPT

## 2023-02-22 NOTE — PROGRESS NOTES
PHYSICAL / OCCUPATIONAL THERAPY - DAILY TREATMENT NOTE (updated )    Patient Name: Petrona Clos    Date: 2023    : 1990  Insurance: Payor: Keatonvel Landa / Plan: Cami Cervantes / Product Type: *No Product type* /      Patient  verified yes     Visit #   Current / Total 7 8-12   Time   In / Out 8:08 8:42   Pain   In / Out 0 0   Subjective Functional Status/Changes: Pt denies pain at this time, reports that she has pain sometimes. Pt reports compliance with HEP. Changes to:  Meds, Allergies, Med Hx, Sx Hx? If yes, update Summary List no       TREATMENT AREA =  Pain in right knee [M25.561]  OBJECTIVE    Therapeutic Procedures: Tx Min Billable or 1:1 Min (if diff from Tx Min) Procedure, Rationale, Specifics   14  16224 Therapeutic Exercise (timed):  increase ROM, strength, coordination, balance, and proprioception to improve patient's ability to progress to PLOF and address remaining functional goals. (see flow sheet as applicable)     Details if applicable:       10  12225 Neuromuscular Re-Education (timed):  improve balance, coordination, kinesthetic sense, posture, core stability and proprioception to improve patient's ability to develop conscious control of individual muscles and awareness of position of extremities in order to progress to PLOF and address remaining functional goals. (see flow sheet as applicable)     Details if applicable:     10  71565 Therapeutic Activity (timed):  use of dynamic activities replicating functional movements to increase ROM, strength, coordination, balance, and proprioception in order to improve patient's ability to progress to PLOF and address remaining functional goals.   (see flow sheet as applicable)     Details if applicable:            Details if applicable:            Details if applicable:     29  [facility] Totals Reminder: bill using total billable min of TIMED therapeutic procedures (example: do not include dry needle or estim unattended, both untimed codes, in totals to left)  8-22 min = 1 unit; 23-37 min = 2 units; 38-52 min = 3 units; 53-67 min = 4 units; 68-82 min = 5 units   Total Total     [x]  Patient Education billed concurrently with other procedures   [x] Review HEP    [] Progressed/Changed HEP, detail:    [] Other detail:       Objective Information/Functional Measures/Assessment    Exercises per flow sheet  Requires intermittent UE support for balance with step-ups with knee drive    Patient will continue to benefit from skilled PT / OT services to modify and progress therapeutic interventions, analyze and address functional mobility deficits, analyze and address ROM deficits, analyze and address strength deficits, analyze and address soft tissue restrictions, analyze and cue for proper movement patterns, analyze and modify for postural abnormalities, analyze and address imbalance/dizziness, and instruct in home and community integration to address functional deficits and attain remaining goals. Progress toward goals / Updated goals:  []  See Progress Note/Recertification    Progressing toward goals:  1. Patient will demonstrate independence with HEP. - Reports compliance   2. Patient will demonstrate 5/5 right hip abduction strength to facilitate gait stability. - Lateral step-ups, lateral monster walk, S/L hip abd, clam, bridge with band, mini-squat with band    3. Patient will report decrease in frequency of episodes of right knee giving way by 50% to allow improved activity tolerance.  - No giving way noted with standing exercises this session         PLAN  yes Continue plan of care  [x]  Upgrade activities as tolerated  []  Discharge due to :  []  Other:    Missy Cornell, PT    2/22/2023    8:10 AM    Future Appointments   Date Time Provider Na Castro   2/27/2023  8:00 AM ALEX Melara [facility]

## 2023-02-24 ENCOUNTER — APPOINTMENT (OUTPATIENT)
Dept: PHYSICAL THERAPY | Age: 33
End: 2023-02-24
Payer: MEDICAID

## 2023-02-24 ENCOUNTER — APPOINTMENT (OUTPATIENT)
Facility: HOSPITAL | Age: 33
End: 2023-02-24
Payer: MEDICAID

## 2023-02-27 ENCOUNTER — APPOINTMENT (OUTPATIENT)
Dept: PHYSICAL THERAPY | Age: 33
End: 2023-02-27
Payer: MEDICAID

## 2023-02-27 ENCOUNTER — HOSPITAL ENCOUNTER (OUTPATIENT)
Facility: HOSPITAL | Age: 33
Setting detail: RECURRING SERIES
Discharge: HOME | End: 2023-03-02
Payer: MEDICAID

## 2023-02-27 PROCEDURE — 97112 NEUROMUSCULAR REEDUCATION: CPT

## 2023-02-27 PROCEDURE — 97530 THERAPEUTIC ACTIVITIES: CPT

## 2023-02-27 PROCEDURE — 97110 THERAPEUTIC EXERCISES: CPT

## 2023-02-27 PROCEDURE — 97116 GAIT TRAINING THERAPY: CPT

## 2023-02-27 NOTE — PROGRESS NOTES
PHYSICAL / OCCUPATIONAL THERAPY - DAILY TREATMENT NOTE (updated )    Patient Name: Destinee Iniguez    Date: 2023    : 1990  Insurance: Payor: Naty Ace / Plan: Christ Los / Product Type: *No Product type* /      Patient  verified yes     Visit #   Current / Total 8 8-12   Time   In / Out 802 850   Pain   In / Out 0/10 0/10   Subjective Functional Status/Changes: \"I am just tired no pain, I am happy its my last shift at [employer]. 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 If yes, update Summary List no       TREATMENT AREA =  No admission diagnoses are documented for this encounter. OBJECTIVE        Therapeutic Procedures: Tx Min Billable or 1:1 Min (if diff from Tx Min) Procedure, Rationale, Specifics   18 10 68248 Therapeutic Exercise (timed):  increase ROM, strength, coordination, balance, and proprioception to improve patient's ability to progress to PLOF and address remaining functional goals. (see flow sheet as applicable)     Details if applicable:        10                                                                                                                                                                                                                                                                                           10 92829 Neuromuscular Re-Education (timed):  improve balance, coordination, kinesthetic sense, posture, core stability and proprioception to improve patient's ability to develop conscious control of individual muscles and awareness of position of extremities in order to progress to PLOF and address remaining functional goals.  (see flow sheet as applicable)     Details if applicable: : I strip to medial and lateral joint line and KT to med/lat knee with \"0\" tech  to promote knee stabilization    10 10 26516 Gait Training (timed):    (I) with monster walk x 60' fwd/retro  And 30' lateral Cuing to decrease valgus performing fwd monster. To improve safety and dynamic movement with household/community ambulation. (see flow sheet as applicable)     Details if applicable:     10 78 04005 Therapeutic Activity (timed):  use of dynamic activities replicating functional movements to increase ROM, strength, coordination, balance, and proprioception in order to improve patient's ability to progress to PLOF and address remaining functional goals. (see flow sheet as applicable)     Details if applicable:     50 40 [facility] Totals Reminder: bill using total billable min of TIMED therapeutic procedures (example: do not include dry needle or estim unattended, both untimed codes, in totals to left)  8-22 min = 1 unit; 23-37 min = 2 units; 38-52 min = 3 units; 53-67 min = 4 units; 68-82 min = 5 units   Total Total     [x]  Patient Education billed concurrently with other procedures   [x] Review HEP    [] Progressed/Changed HEP, detail:    [] Other detail:       Objective Information/Functional Measures/Assessment  VCs + demo to perform proper technique and for safety with TE  Added 2# to SLR 4 way with no difficulty  Initiated BOSU step ups with B UE assist with no c/o p! Demos good quad control with stair training    Patient will continue to benefit from skilled PT / OT services to modify and progress therapeutic interventions, analyze and address functional mobility deficits, analyze and address ROM deficits, analyze and address strength deficits, analyze and address soft tissue restrictions, analyze and cue for proper movement patterns, analyze and modify for postural abnormalities, and instruct in home and community integration to address functional deficits and attain remaining goals. Progress toward goals / Updated goals:  []  See Progress Note/Recertification  New Goals to be achieved in __3-4__  weeks   1. Patient will demonstrate independence with HEP.    2.  Patient will demonstrate 5/5 right hip abduction strength to facilitate gait stability. 3.  Patient will report decrease in frequency of episodes of right knee giving way by 50% to allow improved activity tolerance.         PLAN  yes Continue plan of care  [x]  Upgrade activities as tolerated  []  Discharge due to :  []  Other:    Loren Szymanski PTA    2/27/2023    9:10 AM    Future Appointments   Date Time Provider Na Castro   3/10/2023  8:00 AM Loren Szymanski PTA [facility]   3/17/2023  8:00 AM Loren Szymanski PTA [facility]   3/24/2023  8:00 AM Tania Kim PTA [facility]   3/31/2023  8:00 AM Tania Kim PTA [facility]

## 2023-03-01 ENCOUNTER — APPOINTMENT (OUTPATIENT)
Dept: PHYSICAL THERAPY | Age: 33
End: 2023-03-01